# Patient Record
Sex: MALE | Race: WHITE | NOT HISPANIC OR LATINO | Employment: OTHER | URBAN - METROPOLITAN AREA
[De-identification: names, ages, dates, MRNs, and addresses within clinical notes are randomized per-mention and may not be internally consistent; named-entity substitution may affect disease eponyms.]

---

## 2018-01-05 ENCOUNTER — TRANSCRIBE ORDERS (OUTPATIENT)
Dept: LAB | Facility: CLINIC | Age: 73
End: 2018-01-05

## 2018-01-05 ENCOUNTER — APPOINTMENT (OUTPATIENT)
Dept: LAB | Facility: CLINIC | Age: 73
End: 2018-01-05
Payer: COMMERCIAL

## 2018-01-05 DIAGNOSIS — E03.9 MYXEDEMA HEART DISEASE: ICD-10-CM

## 2018-01-05 DIAGNOSIS — I51.9 MYXEDEMA HEART DISEASE: Primary | ICD-10-CM

## 2018-01-05 DIAGNOSIS — E03.9 MYXEDEMA HEART DISEASE: Primary | ICD-10-CM

## 2018-01-05 DIAGNOSIS — I51.9 MYXEDEMA HEART DISEASE: ICD-10-CM

## 2018-01-05 LAB
ALBUMIN SERPL BCP-MCNC: 3.9 G/DL (ref 3.5–5)
ALP SERPL-CCNC: 67 U/L (ref 46–116)
ALT SERPL W P-5'-P-CCNC: 28 U/L (ref 12–78)
ANION GAP SERPL CALCULATED.3IONS-SCNC: 5 MMOL/L (ref 4–13)
AST SERPL W P-5'-P-CCNC: 13 U/L (ref 5–45)
BASOPHILS # BLD AUTO: 0.03 THOUSANDS/ΜL (ref 0–0.1)
BASOPHILS NFR BLD AUTO: 1 % (ref 0–1)
BILIRUB SERPL-MCNC: 0.38 MG/DL (ref 0.2–1)
BILIRUB UR QL STRIP: NEGATIVE
BUN SERPL-MCNC: 19 MG/DL (ref 5–25)
CALCIUM SERPL-MCNC: 8.9 MG/DL (ref 8.3–10.1)
CHLORIDE SERPL-SCNC: 107 MMOL/L (ref 100–108)
CHOLEST SERPL-MCNC: 225 MG/DL (ref 50–200)
CLARITY UR: CLEAR
CO2 SERPL-SCNC: 28 MMOL/L (ref 21–32)
COLOR UR: YELLOW
CREAT SERPL-MCNC: 0.94 MG/DL (ref 0.6–1.3)
EOSINOPHIL # BLD AUTO: 0.17 THOUSAND/ΜL (ref 0–0.61)
EOSINOPHIL NFR BLD AUTO: 3 % (ref 0–6)
ERYTHROCYTE [DISTWIDTH] IN BLOOD BY AUTOMATED COUNT: 15 % (ref 11.6–15.1)
GFR SERPL CREATININE-BSD FRML MDRD: 81 ML/MIN/1.73SQ M
GLUCOSE P FAST SERPL-MCNC: 97 MG/DL (ref 65–99)
GLUCOSE UR STRIP-MCNC: NEGATIVE MG/DL
HCT VFR BLD AUTO: 43.6 % (ref 36.5–49.3)
HDLC SERPL-MCNC: 51 MG/DL (ref 40–60)
HGB BLD-MCNC: 14.3 G/DL (ref 12–17)
HGB UR QL STRIP.AUTO: NEGATIVE
KETONES UR STRIP-MCNC: NEGATIVE MG/DL
LDLC SERPL CALC-MCNC: 159 MG/DL (ref 0–100)
LEUKOCYTE ESTERASE UR QL STRIP: NEGATIVE
LYMPHOCYTES # BLD AUTO: 1.4 THOUSANDS/ΜL (ref 0.6–4.47)
LYMPHOCYTES NFR BLD AUTO: 23 % (ref 14–44)
MCH RBC QN AUTO: 28.9 PG (ref 26.8–34.3)
MCHC RBC AUTO-ENTMCNC: 32.8 G/DL (ref 31.4–37.4)
MCV RBC AUTO: 88 FL (ref 82–98)
MONOCYTES # BLD AUTO: 0.57 THOUSAND/ΜL (ref 0.17–1.22)
MONOCYTES NFR BLD AUTO: 9 % (ref 4–12)
NEUTROPHILS # BLD AUTO: 4.02 THOUSANDS/ΜL (ref 1.85–7.62)
NEUTS SEG NFR BLD AUTO: 64 % (ref 43–75)
NITRITE UR QL STRIP: NEGATIVE
NRBC BLD AUTO-RTO: 0 /100 WBCS
PH UR STRIP.AUTO: 6 [PH] (ref 4.5–8)
PLATELET # BLD AUTO: 309 THOUSANDS/UL (ref 149–390)
PMV BLD AUTO: 10.1 FL (ref 8.9–12.7)
POTASSIUM SERPL-SCNC: 4.9 MMOL/L (ref 3.5–5.3)
PROT SERPL-MCNC: 7.3 G/DL (ref 6.4–8.2)
PROT UR STRIP-MCNC: NEGATIVE MG/DL
RBC # BLD AUTO: 4.94 MILLION/UL (ref 3.88–5.62)
SODIUM SERPL-SCNC: 140 MMOL/L (ref 136–145)
SP GR UR STRIP.AUTO: 1.02 (ref 1–1.03)
TRIGL SERPL-MCNC: 74 MG/DL
TSH SERPL DL<=0.05 MIU/L-ACNC: 2.17 UIU/ML (ref 0.36–3.74)
UROBILINOGEN UR QL STRIP.AUTO: 0.2 E.U./DL
WBC # BLD AUTO: 6.22 THOUSAND/UL (ref 4.31–10.16)

## 2018-01-05 PROCEDURE — 84443 ASSAY THYROID STIM HORMONE: CPT

## 2018-01-05 PROCEDURE — 36415 COLL VENOUS BLD VENIPUNCTURE: CPT

## 2018-01-05 PROCEDURE — 80061 LIPID PANEL: CPT

## 2018-01-05 PROCEDURE — 81003 URINALYSIS AUTO W/O SCOPE: CPT

## 2018-01-05 PROCEDURE — 80053 COMPREHEN METABOLIC PANEL: CPT

## 2018-01-05 PROCEDURE — 85025 COMPLETE CBC W/AUTO DIFF WBC: CPT

## 2018-12-07 ENCOUNTER — APPOINTMENT (OUTPATIENT)
Dept: LAB | Facility: CLINIC | Age: 73
End: 2018-12-07
Payer: COMMERCIAL

## 2018-12-07 ENCOUNTER — TRANSCRIBE ORDERS (OUTPATIENT)
Dept: LAB | Facility: CLINIC | Age: 73
End: 2018-12-07

## 2018-12-07 DIAGNOSIS — E03.9 MYXEDEMA HEART DISEASE: ICD-10-CM

## 2018-12-07 DIAGNOSIS — E03.9 MYXEDEMA HEART DISEASE: Primary | ICD-10-CM

## 2018-12-07 DIAGNOSIS — I51.9 MYXEDEMA HEART DISEASE: ICD-10-CM

## 2018-12-07 DIAGNOSIS — Z12.5 SPECIAL SCREENING FOR MALIGNANT NEOPLASM OF PROSTATE: ICD-10-CM

## 2018-12-07 DIAGNOSIS — E78.5 HYPERLIPIDEMIA, UNSPECIFIED HYPERLIPIDEMIA TYPE: ICD-10-CM

## 2018-12-07 DIAGNOSIS — I51.9 MYXEDEMA HEART DISEASE: Primary | ICD-10-CM

## 2018-12-07 LAB
ALBUMIN SERPL BCP-MCNC: 3.8 G/DL (ref 3.5–5)
ALP SERPL-CCNC: 66 U/L (ref 46–116)
ALT SERPL W P-5'-P-CCNC: 29 U/L (ref 12–78)
ANION GAP SERPL CALCULATED.3IONS-SCNC: 7 MMOL/L (ref 4–13)
AST SERPL W P-5'-P-CCNC: 14 U/L (ref 5–45)
BASOPHILS # BLD AUTO: 0.06 THOUSANDS/ΜL (ref 0–0.1)
BASOPHILS NFR BLD AUTO: 1 % (ref 0–1)
BILIRUB SERPL-MCNC: 0.46 MG/DL (ref 0.2–1)
BILIRUB UR QL STRIP: NEGATIVE
BUN SERPL-MCNC: 21 MG/DL (ref 5–25)
CALCIUM SERPL-MCNC: 8.8 MG/DL (ref 8.3–10.1)
CHLORIDE SERPL-SCNC: 103 MMOL/L (ref 100–108)
CHOLEST SERPL-MCNC: 207 MG/DL (ref 50–200)
CLARITY UR: CLEAR
CO2 SERPL-SCNC: 29 MMOL/L (ref 21–32)
COLOR UR: YELLOW
CREAT SERPL-MCNC: 0.93 MG/DL (ref 0.6–1.3)
EOSINOPHIL # BLD AUTO: 0.16 THOUSAND/ΜL (ref 0–0.61)
EOSINOPHIL NFR BLD AUTO: 3 % (ref 0–6)
ERYTHROCYTE [DISTWIDTH] IN BLOOD BY AUTOMATED COUNT: 13.4 % (ref 11.6–15.1)
GFR SERPL CREATININE-BSD FRML MDRD: 82 ML/MIN/1.73SQ M
GLUCOSE P FAST SERPL-MCNC: 102 MG/DL (ref 65–99)
GLUCOSE UR STRIP-MCNC: NEGATIVE MG/DL
HCT VFR BLD AUTO: 46.8 % (ref 36.5–49.3)
HDLC SERPL-MCNC: 46 MG/DL (ref 40–60)
HGB BLD-MCNC: 14.9 G/DL (ref 12–17)
HGB UR QL STRIP.AUTO: NEGATIVE
IMM GRANULOCYTES # BLD AUTO: 0.04 THOUSAND/UL (ref 0–0.2)
IMM GRANULOCYTES NFR BLD AUTO: 1 % (ref 0–2)
KETONES UR STRIP-MCNC: NEGATIVE MG/DL
LDLC SERPL CALC-MCNC: 144 MG/DL (ref 0–100)
LEUKOCYTE ESTERASE UR QL STRIP: NEGATIVE
LYMPHOCYTES # BLD AUTO: 1.51 THOUSANDS/ΜL (ref 0.6–4.47)
LYMPHOCYTES NFR BLD AUTO: 27 % (ref 14–44)
MCH RBC QN AUTO: 29.8 PG (ref 26.8–34.3)
MCHC RBC AUTO-ENTMCNC: 31.8 G/DL (ref 31.4–37.4)
MCV RBC AUTO: 94 FL (ref 82–98)
MONOCYTES # BLD AUTO: 0.45 THOUSAND/ΜL (ref 0.17–1.22)
MONOCYTES NFR BLD AUTO: 8 % (ref 4–12)
NEUTROPHILS # BLD AUTO: 3.3 THOUSANDS/ΜL (ref 1.85–7.62)
NEUTS SEG NFR BLD AUTO: 60 % (ref 43–75)
NITRITE UR QL STRIP: NEGATIVE
NONHDLC SERPL-MCNC: 161 MG/DL
NRBC BLD AUTO-RTO: 0 /100 WBCS
PH UR STRIP.AUTO: 5.5 [PH] (ref 4.5–8)
PLATELET # BLD AUTO: 286 THOUSANDS/UL (ref 149–390)
PMV BLD AUTO: 9.8 FL (ref 8.9–12.7)
POTASSIUM SERPL-SCNC: 4.2 MMOL/L (ref 3.5–5.3)
PROT SERPL-MCNC: 7.4 G/DL (ref 6.4–8.2)
PROT UR STRIP-MCNC: NEGATIVE MG/DL
PSA SERPL-MCNC: 1.1 NG/ML (ref 0–4)
RBC # BLD AUTO: 5 MILLION/UL (ref 3.88–5.62)
SODIUM SERPL-SCNC: 139 MMOL/L (ref 136–145)
SP GR UR STRIP.AUTO: 1.02 (ref 1–1.03)
T4 FREE SERPL-MCNC: 1.16 NG/DL (ref 0.76–1.46)
TRIGL SERPL-MCNC: 87 MG/DL
TSH SERPL DL<=0.05 MIU/L-ACNC: 0.96 UIU/ML (ref 0.36–3.74)
UROBILINOGEN UR QL STRIP.AUTO: 0.2 E.U./DL
WBC # BLD AUTO: 5.52 THOUSAND/UL (ref 4.31–10.16)

## 2018-12-07 PROCEDURE — 80061 LIPID PANEL: CPT

## 2018-12-07 PROCEDURE — 80053 COMPREHEN METABOLIC PANEL: CPT

## 2018-12-07 PROCEDURE — 84443 ASSAY THYROID STIM HORMONE: CPT

## 2018-12-07 PROCEDURE — 36415 COLL VENOUS BLD VENIPUNCTURE: CPT

## 2018-12-07 PROCEDURE — 81003 URINALYSIS AUTO W/O SCOPE: CPT

## 2018-12-07 PROCEDURE — G0103 PSA SCREENING: HCPCS

## 2018-12-07 PROCEDURE — 84439 ASSAY OF FREE THYROXINE: CPT

## 2018-12-07 PROCEDURE — 85025 COMPLETE CBC W/AUTO DIFF WBC: CPT

## 2019-09-04 ENCOUNTER — TRANSCRIBE ORDERS (OUTPATIENT)
Dept: LAB | Facility: CLINIC | Age: 74
End: 2019-09-04

## 2019-09-04 ENCOUNTER — APPOINTMENT (OUTPATIENT)
Dept: LAB | Facility: CLINIC | Age: 74
End: 2019-09-04
Payer: COMMERCIAL

## 2019-09-04 DIAGNOSIS — I51.9 MYXEDEMA HEART DISEASE: Primary | ICD-10-CM

## 2019-09-04 DIAGNOSIS — E03.9 MYXEDEMA HEART DISEASE: Primary | ICD-10-CM

## 2019-09-04 DIAGNOSIS — D51.9 ANEMIA DUE TO VITAMIN B12 DEFICIENCY, UNSPECIFIED B12 DEFICIENCY TYPE: ICD-10-CM

## 2019-09-04 DIAGNOSIS — E78.5 HYPERLIPIDEMIA, UNSPECIFIED HYPERLIPIDEMIA TYPE: ICD-10-CM

## 2019-09-04 DIAGNOSIS — R73.01 IMPAIRED FASTING GLUCOSE: Primary | ICD-10-CM

## 2019-09-04 DIAGNOSIS — E55.9 AVITAMINOSIS D: ICD-10-CM

## 2019-09-04 DIAGNOSIS — E03.9 MYXEDEMA HEART DISEASE: ICD-10-CM

## 2019-09-04 DIAGNOSIS — I51.9 MYXEDEMA HEART DISEASE: ICD-10-CM

## 2019-09-04 LAB
25(OH)D3 SERPL-MCNC: 29.9 NG/ML (ref 30–100)
ALBUMIN SERPL BCP-MCNC: 3.9 G/DL (ref 3.5–5)
ALP SERPL-CCNC: 65 U/L (ref 46–116)
ALT SERPL W P-5'-P-CCNC: 38 U/L (ref 12–78)
ANION GAP SERPL CALCULATED.3IONS-SCNC: 7 MMOL/L (ref 4–13)
AST SERPL W P-5'-P-CCNC: 19 U/L (ref 5–45)
BACTERIA UR QL AUTO: NORMAL /HPF
BASOPHILS # BLD AUTO: 0.04 THOUSANDS/ΜL (ref 0–0.1)
BASOPHILS NFR BLD AUTO: 1 % (ref 0–1)
BILIRUB SERPL-MCNC: 0.42 MG/DL (ref 0.2–1)
BILIRUB UR QL STRIP: NEGATIVE
BUN SERPL-MCNC: 24 MG/DL (ref 5–25)
CALCIUM SERPL-MCNC: 8.9 MG/DL (ref 8.3–10.1)
CHLORIDE SERPL-SCNC: 105 MMOL/L (ref 100–108)
CHOLEST SERPL-MCNC: 188 MG/DL (ref 50–200)
CLARITY UR: CLEAR
CO2 SERPL-SCNC: 25 MMOL/L (ref 21–32)
COLOR UR: YELLOW
CREAT SERPL-MCNC: 0.96 MG/DL (ref 0.6–1.3)
EOSINOPHIL # BLD AUTO: 0.25 THOUSAND/ΜL (ref 0–0.61)
EOSINOPHIL NFR BLD AUTO: 4 % (ref 0–6)
ERYTHROCYTE [DISTWIDTH] IN BLOOD BY AUTOMATED COUNT: 13.5 % (ref 11.6–15.1)
EST. AVERAGE GLUCOSE BLD GHB EST-MCNC: 105 MG/DL
GFR SERPL CREATININE-BSD FRML MDRD: 78 ML/MIN/1.73SQ M
GLUCOSE P FAST SERPL-MCNC: 106 MG/DL (ref 65–99)
GLUCOSE UR STRIP-MCNC: NEGATIVE MG/DL
HBA1C MFR BLD: 5.3 % (ref 4.2–6.3)
HCT VFR BLD AUTO: 45.8 % (ref 36.5–49.3)
HDLC SERPL-MCNC: 42 MG/DL (ref 40–60)
HGB BLD-MCNC: 14.8 G/DL (ref 12–17)
HGB UR QL STRIP.AUTO: NEGATIVE
HYALINE CASTS #/AREA URNS LPF: NORMAL /LPF
IMM GRANULOCYTES # BLD AUTO: 0.04 THOUSAND/UL (ref 0–0.2)
IMM GRANULOCYTES NFR BLD AUTO: 1 % (ref 0–2)
KETONES UR STRIP-MCNC: NEGATIVE MG/DL
LDLC SERPL CALC-MCNC: 123 MG/DL (ref 0–100)
LEUKOCYTE ESTERASE UR QL STRIP: NEGATIVE
LYMPHOCYTES # BLD AUTO: 1.89 THOUSANDS/ΜL (ref 0.6–4.47)
LYMPHOCYTES NFR BLD AUTO: 31 % (ref 14–44)
MCH RBC QN AUTO: 30.4 PG (ref 26.8–34.3)
MCHC RBC AUTO-ENTMCNC: 32.3 G/DL (ref 31.4–37.4)
MCV RBC AUTO: 94 FL (ref 82–98)
MONOCYTES # BLD AUTO: 0.48 THOUSAND/ΜL (ref 0.17–1.22)
MONOCYTES NFR BLD AUTO: 8 % (ref 4–12)
NEUTROPHILS # BLD AUTO: 3.48 THOUSANDS/ΜL (ref 1.85–7.62)
NEUTS SEG NFR BLD AUTO: 55 % (ref 43–75)
NITRITE UR QL STRIP: NEGATIVE
NON-SQ EPI CELLS URNS QL MICRO: NORMAL /HPF
NONHDLC SERPL-MCNC: 146 MG/DL
NRBC BLD AUTO-RTO: 0 /100 WBCS
PH UR STRIP.AUTO: 6 [PH]
PLATELET # BLD AUTO: 274 THOUSANDS/UL (ref 149–390)
PMV BLD AUTO: 10.6 FL (ref 8.9–12.7)
POTASSIUM SERPL-SCNC: 4.5 MMOL/L (ref 3.5–5.3)
PROT SERPL-MCNC: 7.1 G/DL (ref 6.4–8.2)
PROT UR STRIP-MCNC: NEGATIVE MG/DL
RBC # BLD AUTO: 4.87 MILLION/UL (ref 3.88–5.62)
RBC #/AREA URNS AUTO: NORMAL /HPF
SODIUM SERPL-SCNC: 137 MMOL/L (ref 136–145)
SP GR UR STRIP.AUTO: 1.02 (ref 1–1.03)
TRIGL SERPL-MCNC: 114 MG/DL
TSH SERPL DL<=0.05 MIU/L-ACNC: 0.24 UIU/ML (ref 0.36–3.74)
UROBILINOGEN UR QL STRIP.AUTO: 0.2 E.U./DL
VIT B12 SERPL-MCNC: 338 PG/ML (ref 100–900)
WBC # BLD AUTO: 6.18 THOUSAND/UL (ref 4.31–10.16)
WBC #/AREA URNS AUTO: NORMAL /HPF

## 2019-09-04 PROCEDURE — 81001 URINALYSIS AUTO W/SCOPE: CPT

## 2019-09-04 PROCEDURE — 84443 ASSAY THYROID STIM HORMONE: CPT

## 2019-09-04 PROCEDURE — 85025 COMPLETE CBC W/AUTO DIFF WBC: CPT

## 2019-09-04 PROCEDURE — 82306 VITAMIN D 25 HYDROXY: CPT

## 2019-09-04 PROCEDURE — 80053 COMPREHEN METABOLIC PANEL: CPT

## 2019-09-04 PROCEDURE — 82607 VITAMIN B-12: CPT

## 2019-09-04 PROCEDURE — 83036 HEMOGLOBIN GLYCOSYLATED A1C: CPT

## 2019-09-04 PROCEDURE — 36415 COLL VENOUS BLD VENIPUNCTURE: CPT

## 2019-09-04 PROCEDURE — 80061 LIPID PANEL: CPT

## 2020-09-09 DIAGNOSIS — E03.9 ACQUIRED HYPOTHYROIDISM: Primary | ICD-10-CM

## 2020-09-09 RX ORDER — LEVOTHYROXINE SODIUM 0.12 MG/1
TABLET ORAL
Qty: 90 TABLET | Refills: 0 | Status: SHIPPED | OUTPATIENT
Start: 2020-09-09 | End: 2020-12-28

## 2020-11-06 ENCOUNTER — TELEPHONE (OUTPATIENT)
Dept: INTERNAL MEDICINE CLINIC | Facility: CLINIC | Age: 75
End: 2020-11-06

## 2020-11-10 ENCOUNTER — TELEMEDICINE (OUTPATIENT)
Dept: INTERNAL MEDICINE CLINIC | Facility: CLINIC | Age: 75
End: 2020-11-10
Payer: COMMERCIAL

## 2020-11-10 DIAGNOSIS — E03.9 ACQUIRED HYPOTHYROIDISM: ICD-10-CM

## 2020-11-10 DIAGNOSIS — I10 ESSENTIAL HYPERTENSION: Primary | ICD-10-CM

## 2020-11-10 PROCEDURE — 99213 OFFICE O/P EST LOW 20 MIN: CPT | Performed by: INTERNAL MEDICINE

## 2020-11-25 ENCOUNTER — TELEPHONE (OUTPATIENT)
Dept: INTERNAL MEDICINE CLINIC | Facility: CLINIC | Age: 75
End: 2020-11-25

## 2020-12-02 ENCOUNTER — OFFICE VISIT (OUTPATIENT)
Dept: INTERNAL MEDICINE CLINIC | Facility: CLINIC | Age: 75
End: 2020-12-02
Payer: COMMERCIAL

## 2020-12-02 VITALS
BODY MASS INDEX: 38.06 KG/M2 | HEART RATE: 58 BPM | WEIGHT: 257 LBS | HEIGHT: 69 IN | SYSTOLIC BLOOD PRESSURE: 138 MMHG | TEMPERATURE: 97.9 F | DIASTOLIC BLOOD PRESSURE: 85 MMHG

## 2020-12-02 DIAGNOSIS — Z95.0 PACEMAKER: ICD-10-CM

## 2020-12-02 DIAGNOSIS — E03.9 ACQUIRED HYPOTHYROIDISM: ICD-10-CM

## 2020-12-02 DIAGNOSIS — I49.5 SICK SINUS SYNDROME (HCC): ICD-10-CM

## 2020-12-02 DIAGNOSIS — E66.09 CLASS 2 OBESITY DUE TO EXCESS CALORIES WITHOUT SERIOUS COMORBIDITY WITH BODY MASS INDEX (BMI) OF 37.0 TO 37.9 IN ADULT: ICD-10-CM

## 2020-12-02 DIAGNOSIS — Z00.00 MEDICARE ANNUAL WELLNESS VISIT, INITIAL: Primary | ICD-10-CM

## 2020-12-02 DIAGNOSIS — E78.2 MIXED HYPERLIPIDEMIA: ICD-10-CM

## 2020-12-02 PROBLEM — G45.4 TRANSIENT GLOBAL AMNESIA: Status: ACTIVE | Noted: 2019-11-19

## 2020-12-02 PROBLEM — E78.5 HYPERLIPIDEMIA: Status: ACTIVE | Noted: 2020-12-02

## 2020-12-02 PROCEDURE — 1170F FXNL STATUS ASSESSED: CPT | Performed by: INTERNAL MEDICINE

## 2020-12-02 PROCEDURE — 1125F AMNT PAIN NOTED PAIN PRSNT: CPT | Performed by: INTERNAL MEDICINE

## 2020-12-02 PROCEDURE — 3008F BODY MASS INDEX DOCD: CPT | Performed by: INTERNAL MEDICINE

## 2020-12-02 PROCEDURE — 99214 OFFICE O/P EST MOD 30 MIN: CPT | Performed by: INTERNAL MEDICINE

## 2020-12-02 PROCEDURE — 3075F SYST BP GE 130 - 139MM HG: CPT | Performed by: INTERNAL MEDICINE

## 2020-12-02 PROCEDURE — 3079F DIAST BP 80-89 MM HG: CPT | Performed by: INTERNAL MEDICINE

## 2020-12-02 PROCEDURE — G0439 PPPS, SUBSEQ VISIT: HCPCS | Performed by: INTERNAL MEDICINE

## 2020-12-02 PROCEDURE — 1160F RVW MEDS BY RX/DR IN RCRD: CPT | Performed by: INTERNAL MEDICINE

## 2020-12-02 RX ORDER — LANOLIN ALCOHOL/MO/W.PET/CERES
1 CREAM (GRAM) TOPICAL 2 TIMES DAILY
COMMUNITY
End: 2021-06-20

## 2020-12-02 RX ORDER — LEVOTHYROXINE SODIUM 0.12 MG/1
150 TABLET ORAL DAILY
COMMUNITY
End: 2020-12-02

## 2020-12-02 RX ORDER — A/SINGAPORE/GP1908/2015 IVR-180 (AN A/MICHIGAN/45/2015 (H1N1)PDM09-LIKE VIRUS, A/HONG KONG/4801/2014, NYMC X-263B (H3N2) (AN A/HONG KONG/4801/2014-LIKE VIRUS), AND B/BRISBANE/60/2008, WILD TYPE (A B/BRISBANE/60/2008-LIKE VIRUS) 15; 15; 15 UG/.5ML; UG/.5ML; UG/.5ML
INJECTION, SUSPENSION INTRAMUSCULAR
COMMUNITY
Start: 2020-10-08 | End: 2021-06-20

## 2020-12-02 RX ORDER — AMPICILLIN TRIHYDRATE 250 MG
600 CAPSULE ORAL 2 TIMES DAILY
COMMUNITY

## 2020-12-26 DIAGNOSIS — E03.9 ACQUIRED HYPOTHYROIDISM: ICD-10-CM

## 2020-12-28 RX ORDER — LEVOTHYROXINE SODIUM 0.12 MG/1
TABLET ORAL
Qty: 90 TABLET | Refills: 0 | Status: SHIPPED | OUTPATIENT
Start: 2020-12-28 | End: 2020-12-30 | Stop reason: SDUPTHER

## 2020-12-30 ENCOUNTER — TELEPHONE (OUTPATIENT)
Dept: INTERNAL MEDICINE CLINIC | Facility: CLINIC | Age: 75
End: 2020-12-30

## 2020-12-30 DIAGNOSIS — E03.9 ACQUIRED HYPOTHYROIDISM: ICD-10-CM

## 2020-12-30 RX ORDER — LEVOTHYROXINE SODIUM 0.12 MG/1
125 TABLET ORAL DAILY
Qty: 90 TABLET | Refills: 0 | Status: SHIPPED | OUTPATIENT
Start: 2020-12-30 | End: 2021-01-13 | Stop reason: SDUPTHER

## 2021-01-05 ENCOUNTER — LAB (OUTPATIENT)
Dept: LAB | Facility: CLINIC | Age: 76
End: 2021-01-05
Payer: COMMERCIAL

## 2021-01-05 DIAGNOSIS — E03.9 ACQUIRED HYPOTHYROIDISM: ICD-10-CM

## 2021-01-05 LAB
ALBUMIN SERPL BCP-MCNC: 3.6 G/DL (ref 3.5–5)
ALP SERPL-CCNC: 65 U/L (ref 46–116)
ALT SERPL W P-5'-P-CCNC: 26 U/L (ref 12–78)
ANION GAP SERPL CALCULATED.3IONS-SCNC: 1 MMOL/L (ref 4–13)
AST SERPL W P-5'-P-CCNC: 7 U/L (ref 5–45)
BILIRUB SERPL-MCNC: 0.46 MG/DL (ref 0.2–1)
BUN SERPL-MCNC: 22 MG/DL (ref 5–25)
CALCIUM SERPL-MCNC: 8.9 MG/DL (ref 8.3–10.1)
CHLORIDE SERPL-SCNC: 106 MMOL/L (ref 100–108)
CO2 SERPL-SCNC: 30 MMOL/L (ref 21–32)
CREAT SERPL-MCNC: 0.93 MG/DL (ref 0.6–1.3)
GFR SERPL CREATININE-BSD FRML MDRD: 81 ML/MIN/1.73SQ M
GLUCOSE P FAST SERPL-MCNC: 96 MG/DL (ref 65–99)
POTASSIUM SERPL-SCNC: 4.8 MMOL/L (ref 3.5–5.3)
PROT SERPL-MCNC: 6.9 G/DL (ref 6.4–8.2)
SODIUM SERPL-SCNC: 137 MMOL/L (ref 136–145)
T4 FREE SERPL-MCNC: 0.9 NG/DL (ref 0.76–1.46)
TSH SERPL DL<=0.05 MIU/L-ACNC: 2.76 UIU/ML (ref 0.36–3.74)

## 2021-01-05 PROCEDURE — 36415 COLL VENOUS BLD VENIPUNCTURE: CPT

## 2021-01-05 PROCEDURE — 84439 ASSAY OF FREE THYROXINE: CPT

## 2021-01-05 PROCEDURE — 80053 COMPREHEN METABOLIC PANEL: CPT

## 2021-01-05 PROCEDURE — 84443 ASSAY THYROID STIM HORMONE: CPT

## 2021-01-13 DIAGNOSIS — E03.9 ACQUIRED HYPOTHYROIDISM: ICD-10-CM

## 2021-01-13 RX ORDER — LEVOTHYROXINE SODIUM 0.12 MG/1
125 TABLET ORAL DAILY
Qty: 90 TABLET | Refills: 0 | Status: SHIPPED | OUTPATIENT
Start: 2021-01-13 | End: 2021-04-14 | Stop reason: SDUPTHER

## 2021-01-13 RX ORDER — LEVOTHYROXINE SODIUM 0.12 MG/1
125 TABLET ORAL DAILY
Qty: 90 TABLET | Refills: 1 | Status: CANCELLED | OUTPATIENT
Start: 2021-01-13

## 2021-01-13 NOTE — TELEPHONE ENCOUNTER
I already spoke to the pt  The automated system requested the initial refill but th ept has not been in [id number] [facility] since October  He does not have enough medication and needs it sent to the correct pharmacy  I requested to disregard this task because Soraida Ni also sent you a refill for this earlier

## 2021-01-20 DIAGNOSIS — Z23 ENCOUNTER FOR IMMUNIZATION: ICD-10-CM

## 2021-01-22 ENCOUNTER — IMMUNIZATIONS (OUTPATIENT)
Dept: FAMILY MEDICINE CLINIC | Facility: HOSPITAL | Age: 76
End: 2021-01-22

## 2021-01-22 DIAGNOSIS — Z23 ENCOUNTER FOR IMMUNIZATION: Primary | ICD-10-CM

## 2021-01-22 PROCEDURE — 0011A SARS-COV-2 / COVID-19 MRNA VACCINE (MODERNA) 100 MCG: CPT

## 2021-01-22 PROCEDURE — 91301 SARS-COV-2 / COVID-19 MRNA VACCINE (MODERNA) 100 MCG: CPT

## 2021-02-17 ENCOUNTER — IMMUNIZATIONS (OUTPATIENT)
Dept: FAMILY MEDICINE CLINIC | Facility: HOSPITAL | Age: 76
End: 2021-02-17

## 2021-02-17 DIAGNOSIS — Z23 ENCOUNTER FOR IMMUNIZATION: Primary | ICD-10-CM

## 2021-02-17 PROCEDURE — 91301 SARS-COV-2 / COVID-19 MRNA VACCINE (MODERNA) 100 MCG: CPT | Performed by: EMERGENCY MEDICINE

## 2021-02-17 PROCEDURE — 0012A SARS-COV-2 / COVID-19 MRNA VACCINE (MODERNA) 100 MCG: CPT | Performed by: EMERGENCY MEDICINE

## 2021-03-10 ENCOUNTER — OFFICE VISIT (OUTPATIENT)
Dept: INTERNAL MEDICINE CLINIC | Facility: CLINIC | Age: 76
End: 2021-03-10
Payer: COMMERCIAL

## 2021-03-10 ENCOUNTER — APPOINTMENT (OUTPATIENT)
Dept: LAB | Facility: HOSPITAL | Age: 76
End: 2021-03-10
Attending: INTERNAL MEDICINE
Payer: COMMERCIAL

## 2021-03-10 VITALS
HEART RATE: 58 BPM | TEMPERATURE: 99.1 F | DIASTOLIC BLOOD PRESSURE: 90 MMHG | SYSTOLIC BLOOD PRESSURE: 160 MMHG | BODY MASS INDEX: 38.95 KG/M2 | HEIGHT: 69 IN | WEIGHT: 263 LBS

## 2021-03-10 DIAGNOSIS — I50.41 ACUTE COMBINED SYSTOLIC AND DIASTOLIC CONGESTIVE HEART FAILURE (HCC): Primary | ICD-10-CM

## 2021-03-10 DIAGNOSIS — E03.9 ACQUIRED HYPOTHYROIDISM: ICD-10-CM

## 2021-03-10 DIAGNOSIS — I50.41 ACUTE COMBINED SYSTOLIC AND DIASTOLIC CONGESTIVE HEART FAILURE (HCC): ICD-10-CM

## 2021-03-10 DIAGNOSIS — E78.2 MIXED HYPERLIPIDEMIA: ICD-10-CM

## 2021-03-10 DIAGNOSIS — Z12.11 ENCOUNTER FOR SCREENING FOR MALIGNANT NEOPLASM OF COLON: ICD-10-CM

## 2021-03-10 DIAGNOSIS — Z95.0 PACEMAKER: ICD-10-CM

## 2021-03-10 DIAGNOSIS — E66.9 OBESITY (BMI 30-39.9): ICD-10-CM

## 2021-03-10 LAB
ALBUMIN SERPL BCP-MCNC: 3.8 G/DL (ref 3.5–5)
ALP SERPL-CCNC: 66 U/L (ref 46–116)
ALT SERPL W P-5'-P-CCNC: 34 U/L (ref 12–78)
ANION GAP SERPL CALCULATED.3IONS-SCNC: 3 MMOL/L (ref 4–13)
AST SERPL W P-5'-P-CCNC: 11 U/L (ref 5–45)
BASOPHILS # BLD AUTO: 0.05 THOUSANDS/ΜL (ref 0–0.1)
BASOPHILS NFR BLD AUTO: 1 % (ref 0–1)
BILIRUB SERPL-MCNC: 0.38 MG/DL (ref 0.2–1)
BUN SERPL-MCNC: 21 MG/DL (ref 5–25)
CALCIUM SERPL-MCNC: 9.1 MG/DL (ref 8.3–10.1)
CHLORIDE SERPL-SCNC: 111 MMOL/L (ref 100–108)
CO2 SERPL-SCNC: 27 MMOL/L (ref 21–32)
CREAT SERPL-MCNC: 1.03 MG/DL (ref 0.6–1.3)
EOSINOPHIL # BLD AUTO: 0.2 THOUSAND/ΜL (ref 0–0.61)
EOSINOPHIL NFR BLD AUTO: 3 % (ref 0–6)
ERYTHROCYTE [DISTWIDTH] IN BLOOD BY AUTOMATED COUNT: 13.8 % (ref 11.6–15.1)
GFR SERPL CREATININE-BSD FRML MDRD: 71 ML/MIN/1.73SQ M
GLUCOSE SERPL-MCNC: 95 MG/DL (ref 65–140)
HCT VFR BLD AUTO: 43.1 % (ref 36.5–49.3)
HGB BLD-MCNC: 13.9 G/DL (ref 12–17)
IMM GRANULOCYTES # BLD AUTO: 0.04 THOUSAND/UL (ref 0–0.2)
IMM GRANULOCYTES NFR BLD AUTO: 1 % (ref 0–2)
LYMPHOCYTES # BLD AUTO: 1.82 THOUSANDS/ΜL (ref 0.6–4.47)
LYMPHOCYTES NFR BLD AUTO: 27 % (ref 14–44)
MCH RBC QN AUTO: 29.8 PG (ref 26.8–34.3)
MCHC RBC AUTO-ENTMCNC: 32.3 G/DL (ref 31.4–37.4)
MCV RBC AUTO: 92 FL (ref 82–98)
MONOCYTES # BLD AUTO: 0.49 THOUSAND/ΜL (ref 0.17–1.22)
MONOCYTES NFR BLD AUTO: 7 % (ref 4–12)
NEUTROPHILS # BLD AUTO: 4.28 THOUSANDS/ΜL (ref 1.85–7.62)
NEUTS SEG NFR BLD AUTO: 61 % (ref 43–75)
NRBC BLD AUTO-RTO: 0 /100 WBCS
NT-PROBNP SERPL-MCNC: 207 PG/ML
PLATELET # BLD AUTO: 334 THOUSANDS/UL (ref 149–390)
PMV BLD AUTO: 9.8 FL (ref 8.9–12.7)
POTASSIUM SERPL-SCNC: 4.4 MMOL/L (ref 3.5–5.3)
PROT SERPL-MCNC: 7 G/DL (ref 6.4–8.2)
RBC # BLD AUTO: 4.67 MILLION/UL (ref 3.88–5.62)
SODIUM SERPL-SCNC: 141 MMOL/L (ref 136–145)
TROPONIN I SERPL-MCNC: <0.02 NG/ML
WBC # BLD AUTO: 6.88 THOUSAND/UL (ref 4.31–10.16)

## 2021-03-10 PROCEDURE — 83880 ASSAY OF NATRIURETIC PEPTIDE: CPT

## 2021-03-10 PROCEDURE — 84484 ASSAY OF TROPONIN QUANT: CPT

## 2021-03-10 PROCEDURE — 80053 COMPREHEN METABOLIC PANEL: CPT

## 2021-03-10 PROCEDURE — 3008F BODY MASS INDEX DOCD: CPT | Performed by: INTERNAL MEDICINE

## 2021-03-10 PROCEDURE — 1036F TOBACCO NON-USER: CPT | Performed by: INTERNAL MEDICINE

## 2021-03-10 PROCEDURE — 99214 OFFICE O/P EST MOD 30 MIN: CPT | Performed by: INTERNAL MEDICINE

## 2021-03-10 PROCEDURE — 36415 COLL VENOUS BLD VENIPUNCTURE: CPT

## 2021-03-10 PROCEDURE — 85025 COMPLETE CBC W/AUTO DIFF WBC: CPT

## 2021-03-10 RX ORDER — FUROSEMIDE 20 MG/1
20 TABLET ORAL DAILY
Qty: 30 TABLET | Refills: 3 | Status: SHIPPED | OUTPATIENT
Start: 2021-03-10 | End: 2021-06-20

## 2021-03-10 NOTE — PROGRESS NOTES
Assessment/Plan: This is a 80-year-old gentleman who is supposed to fly out to [place] tomorrow  He complains of right ankle swelling  Denies any chest pain or shortness of breath  He has a history of   Sick sinus syndrome, transient global amnesia pacemaker insertion hypothyroidism and obesity  Labs were ordered to rule out congestive heart failure  Because he will be flying instructions were given to keep ambulating  periodically  1  Acute combined systolic and diastolic congestive heart failure ([facility] [address] )  Comments:   labs were ordered will monitor  Orders:  -     NT-BNP PRO; Future  -     CBC and differential; Future  -     Comprehensive metabolic panel; Future  -     Troponin I; Future  -     Echo complete with contrast if indicated; Future; Expected date: 03/10/2021  -     furosemide (LASIX) 20 mg tablet; Take 1 tablet (20 mg total) by mouth daily    2  Acquired hypothyroidism  Comments:    Continue meds monitor labs  3  Mixed hyperlipidemia    4  Pacemaker    5  Encounter for screening for malignant neoplasm of colon  -     Occult Bloood,Fecal Immunochemical; Future    6  Obesity (BMI 30-39  9)  Comments:    Weight loss was strongly recommended  1  Acquired hypothyroidism      2  Pacemaker      3  Encounter for screening for malignant neoplasm of colon    - Occult Bloood,Fecal Immunochemical; Future           Subjective:      Patient ID: Lesley Zayas is a 77 y o  male  This is a 80-year-old gentleman who  Presents with right leg swelling  Denies any chest pain nocturnal dyspnea  He has gained significant amount of weight lately  The following portions of the patient's history were reviewed and updated as appropriate: He  has a past medical history of Disease of thyroid gland    He   Patient Active Problem List    Diagnosis Date Noted    Obesity (BMI 30-39 9) 03/10/2021    Hyperlipidemia 12/02/2020    Transient global amnesia 11/19/2019    Pacemaker 08/22/2016    Sick sinus syndrome ([facility] [address] ) 08/15/2016    Obesity, unspecified 03/07/2014    Hypothyroidism 12/19/2013     He  has no past surgical history on file  His family history includes COPD in his father; Myasthenia gravis in his mother  He  reports that he has never smoked  He has never used smokeless tobacco  No history on file for alcohol and drug  Current Outpatient Medications   Medication Sig Dispense Refill    glucosamine-chondroitin 500-400 MG tablet Take 1 tablet by mouth 2 (two) times a day      levothyroxine 125 mcg tablet Take 1 tablet (125 mcg total) by mouth daily 90 tablet 0    Red Yeast Rice 600 MG CAPS Take 600 mg by mouth 2 (two) times a day      Fluad Quadrivalent 0 5 ML PRSY       furosemide (LASIX) 20 mg tablet Take 1 tablet (20 mg total) by mouth daily 30 tablet 3     No current facility-administered medications for this visit  Current Outpatient Medications on File Prior to Visit   Medication Sig    glucosamine-chondroitin 500-400 MG tablet Take 1 tablet by mouth 2 (two) times a day    levothyroxine 125 mcg tablet Take 1 tablet (125 mcg total) by mouth daily    Red Yeast Rice 600 MG CAPS Take 600 mg by mouth 2 (two) times a day    Fluad Quadrivalent 0 5 ML PRSY      No current facility-administered medications on file prior to visit  He is allergic to iodine       Review of Systems   Constitutional: Negative for appetite change, chills, fatigue and fever  HENT: Negative for sore throat and trouble swallowing  Eyes: Negative for redness  Respiratory: Negative for shortness of breath  Cardiovascular: Negative for chest pain and palpitations  Gastrointestinal: Negative for abdominal pain, constipation and diarrhea  Genitourinary: Negative for dysuria and hematuria  Musculoskeletal: Negative for back pain and neck pain  Skin: Negative for rash  Neurological: Negative for seizures, weakness and headaches  Hematological: Negative for adenopathy  Psychiatric/Behavioral: Negative for confusion  The patient is not nervous/anxious            Objective:      /90   Pulse 58   Temp 99 1 °F (37 3 °C)   Ht 5' 9" (1 753 m)   Wt 119 kg (263 lb)   BMI 38 84 kg/m²     Recent Results (from the past 1344 hour(s))   NT-BNP PRO    Collection Time: 03/10/21 11:58 AM   Result Value Ref Range    NT-proBNP 207 <450 pg/mL   CBC and differential    Collection Time: 03/10/21 11:58 AM   Result Value Ref Range    WBC 6 88 4 31 - 10 16 Thousand/uL    RBC 4 67 3 88 - 5 62 Million/uL    Hemoglobin 13 9 12 0 - 17 0 g/dL    Hematocrit 43 1 36 5 - 49 3 %    MCV 92 82 - 98 fL    MCH 29 8 26 8 - 34 3 pg    MCHC 32 3 31 4 - 37 4 g/dL    RDW 13 8 11 6 - 15 1 %    MPV 9 8 8 9 - 12 7 fL    Platelets 007 103 - 379 Thousands/uL    nRBC 0 /100 WBCs    Neutrophils Relative 61 43 - 75 %    Immat GRANS % 1 0 - 2 %    Lymphocytes Relative 27 14 - 44 %    Monocytes Relative 7 4 - 12 %    Eosinophils Relative 3 0 - 6 %    Basophils Relative 1 0 - 1 %    Neutrophils Absolute 4 28 1 85 - 7 62 Thousands/µL    Immature Grans Absolute 0 04 0 00 - 0 20 Thousand/uL    Lymphocytes Absolute 1 82 0 60 - 4 47 Thousands/µL    Monocytes Absolute 0 49 0 17 - 1 22 Thousand/µL    Eosinophils Absolute 0 20 0 00 - 0 61 Thousand/µL    Basophils Absolute 0 05 0 00 - 0 10 Thousands/µL   Comprehensive metabolic panel    Collection Time: 03/10/21 11:58 AM   Result Value Ref Range    Sodium 141 136 - 145 mmol/L    Potassium 4 4 3 5 - 5 3 mmol/L    Chloride 111 (H) 100 - 108 mmol/L    CO2 27 21 - 32 mmol/L    ANION GAP 3 (L) 4 - 13 mmol/L    BUN 21 5 - 25 mg/dL    Creatinine 1 03 0 60 - 1 30 mg/dL    Glucose 95 65 - 140 mg/dL    Calcium 9 1 8 3 - 10 1 mg/dL    AST 11 5 - 45 U/L    ALT 34 12 - 78 U/L    Alkaline Phosphatase 66 46 - 116 U/L    Total Protein 7 0 6 4 - 8 2 g/dL    Albumin 3 8 3 5 - 5 0 g/dL    Total Bilirubin 0 38 0 20 - 1 00 mg/dL    eGFR 71 ml/min/1 73sq m   Troponin I    Collection Time: 03/10/21 11:58 AM   Result Value Ref Range    Troponin I <0 02 <=0 04 ng/mL        Physical Exam  Constitutional:       General: He is not in acute distress  Appearance: Normal appearance  HENT:      Head: Normocephalic and atraumatic  Nose: Nose normal       Mouth/Throat:      Mouth: Mucous membranes are moist    Eyes:      Extraocular Movements: Extraocular movements intact  Pupils: Pupils are equal, round, and reactive to light  Neck:      Musculoskeletal: Normal range of motion and neck supple  Cardiovascular:      Rate and Rhythm: Normal rate and regular rhythm  Pulses: Normal pulses  Heart sounds: Normal heart sounds  No murmur  No friction rub  Pulmonary:      Effort: Pulmonary effort is normal  No respiratory distress  Breath sounds: Normal breath sounds  No wheezing  Abdominal:      General: Abdomen is flat  Bowel sounds are normal  There is no distension  Palpations: Abdomen is soft  There is no mass  Tenderness: There is no abdominal tenderness  There is no guarding  Musculoskeletal: Normal range of motion  Right lower leg: Edema present  Comments: No calf tenderness or erythema is appreciated   Neurological:      General: No focal deficit present  Mental Status: He is alert and oriented to person, place, and time  Mental status is at baseline  Cranial Nerves: No cranial nerve deficit  Psychiatric:         Mood and Affect: Mood normal          Behavior: Behavior normal      EKG: unchanged from previous tracings, normal sinus rhythm

## 2021-03-10 NOTE — PATIENT INSTRUCTIONS
Heart Failure   AMBULATORY CARE:   Heart failure (HF) is a condition that does not allow your heart to fill or pump properly  Not enough oxygen in your blood gets to your organs and tissues  Fluid may not move through your body properly  Fluid builds up and causes swelling and difficulty breathing  This is known as congestive heart failure  HF may start in the left or right ventricle  HF is often caused by damage or injury to your heart  The damage may be caused by other heart problems, diabetes, or high blood pressure  The damage may have also been caused by an infection  HF is a long-term condition that tends to get worse over time  It is important to manage your health to improve your quality of life  Common signs and symptoms:   · Difficulty breathing with activity that worsens to difficulty breathing at rest    · Shortness of breath while lying flat    · Severe shortness of breath and coughing at night that usually wakes you    · Chest pain at night    · Periods of no breathing, then breathing fast    · Fatigue or lack of energy (often worsened by physical activity)    · Swelling in your ankles, legs, or abdomen    · Fast heartbeat, purple color around your mouth and nails    · Fingers and toes cool to the touch    Call your local emergency number (911 in the [address] [facility]) if:   · You have any of the following signs of a heart attack:      ? Squeezing, pressure, or pain in your chest    ? You may  also have any of the following:     § Discomfort or pain in your back, neck, jaw, stomach, or arm    § Shortness of breath    § Nausea or vomiting    § Lightheadedness or a sudden cold sweat      Call your doctor if:   · Your heartbeat is fast, slow, or uneven all the time  · You have symptoms of worsening HF:      ? Shortness of breath at rest, at night, or that is getting worse in any way    ? Weight gain of 3 or more pounds (1 4 kg) in a day, or more than your healthcare provider says is okay    ?  More swelling in your legs or ankles    ? Abdominal pain or swelling    ? More coughing    ? Loss of appetite    ? Feeling tired all the time    · You feel hopeless or depressed, or you have lost interest in things you used to enjoy  · You often feel worried or afraid  · You have questions or concerns about your condition or care  Treatment for HF  may include any of the following:  · Medicines  may be needed to help regulate your heart rhythm  You may also need medicines to lower your blood pressure, and to decrease extra fluids  · Oxygen  may help you breathe easier if your oxygen level is lower than normal  A CPAP machine may be used to keep your airway open while you sleep  · Cardiac rehab  is a program run by specialists who will help you safely strengthen your heart  In the program you will learn about exercise, relaxation, stress management, and heart-healthy nutrition  Cardiac rehab may be recommended if your HF is not severe  · Surgery  can be done to implant a pacemaker or another device in your chest to regulate your heart rhythm  Other types of surgery can open blocked heart vessels, replace a damaged heart valve, or remove scar tissue  Manage or prevent HF:  Your quality of life may improve with treatment and the following:  · Do not smoke  Nicotine and other chemicals in cigarettes and cigars can cause lung and heart damage  Ask your healthcare provider for information if you currently smoke and need help to quit  E-cigarettes or smokeless tobacco still contain nicotine  Talk to your healthcare provider before you use these products  · Do not drink alcohol or use illegal drugs  Alcohol and drugs can increase your risk for high blood pressure, diabetes, and coronary artery disease  · Eat heart-healthy foods and limit sodium (salt)  Eat more fresh fruits and vegetables  Eat fewer canned and processed foods   Replace butter and margarine with heart-healthy oils such as olive oil and canola oil  Other heart-healthy foods include walnuts, whole-grain breads, low-fat dairy products, beans, and lean meats  Fatty fish such as salmon and tuna are also heart healthy  Ask how much salt you can eat each day  · Manage any chronic health conditions you have  These include high blood pressure, diabetes, obesity, high cholesterol, metabolic syndrome, and COPD  You will have fewer symptoms if you manage these health conditions  Follow your healthcare provider's recommendations and follow up with him or her regularly  · Drink liquids as directed  You may need to limit the amount of liquids you drink if you have fluid buildup  Ask how much liquid to drink each day and which liquids are best for you  · Maintain a healthy weight  Being overweight can increase your risk for high blood pressure, diabetes, and coronary artery disease  These conditions can make your symptoms worse  Ask your healthcare provider how much you should weigh  Ask him or her to help you create a weight loss plan if you are overweight  · Stay active  Activity can help keep your symptoms from getting worse  Walking is a type of physical activity that helps maintain your strength and improve your mood  Physical activity also helps you manage your weight  Work with your healthcare provider to create an exercise plan that is right for you  · Weigh yourself every morning  Use the same scale, in the same spot  Do this after you use the bathroom, but before you eat or drink  Wear the same type of clothing each time  Write down your weight and call your healthcare provider if you have a sudden weight gain  Swelling and weight gain are signs of fluid buildup  · Get vaccines as directed  Get a flu shot every year  You may also need the pneumonia vaccine  The flu and pneumonia can be severe for a person who has HF  Vaccines protect you from these infections  Join a support group:  HF can be difficult to manage   It may be helpful to talk with others who have HF  You may learn how to better manage your condition or get emotional support  For more information:  · [address]  [place] , [place]   Phone: 0- 444 - [phone]  Web Address: https://groSolar     Follow up with your doctor or cardiologist within 7 days or as directed: You may need to return for other tests  You may need home health care  A healthcare provider will monitor your vital signs, weight, and make sure your medicines are working  Write down your questions so you remember to ask them during your visits  © Copyright [address] Information is for End User's use only and may not be sold, redistributed or otherwise used for commercial purposes  All illustrations and images included in CareNotes® are the copyrighted property of A D A M , Inc  or Dejon Varghese   The above information is an  only  It is not intended as medical advice for individual conditions or treatments  Talk to your doctor, nurse or pharmacist before following any medical regimen to see if it is safe and effective for you

## 2021-03-17 ENCOUNTER — APPOINTMENT (OUTPATIENT)
Dept: LAB | Facility: HOSPITAL | Age: 76
End: 2021-03-17
Attending: INTERNAL MEDICINE
Payer: COMMERCIAL

## 2021-03-17 DIAGNOSIS — Z12.11 ENCOUNTER FOR SCREENING FOR MALIGNANT NEOPLASM OF COLON: ICD-10-CM

## 2021-03-17 LAB — HEMOCCULT STL QL IA: NEGATIVE

## 2021-03-17 PROCEDURE — G0328 FECAL BLOOD SCRN IMMUNOASSAY: HCPCS

## 2021-04-14 DIAGNOSIS — E03.9 ACQUIRED HYPOTHYROIDISM: ICD-10-CM

## 2021-04-14 RX ORDER — LEVOTHYROXINE SODIUM 0.12 MG/1
125 TABLET ORAL DAILY
Qty: 90 TABLET | Refills: 0 | Status: SHIPPED | OUTPATIENT
Start: 2021-04-14 | End: 2021-06-14 | Stop reason: SDUPTHER

## 2021-05-07 ENCOUNTER — RA CDI HCC (OUTPATIENT)
Dept: OTHER | Facility: HOSPITAL | Age: 76
End: 2021-05-07

## 2021-05-08 NOTE — PROGRESS NOTES
[facility]  coding opportunities          Chart reviewed, no opportunity found: CHART REVIEWED, NO OPPORTUNITY FOUND welling, nodes

## 2021-05-11 ENCOUNTER — TELEMEDICINE (OUTPATIENT)
Dept: INTERNAL MEDICINE CLINIC | Facility: CLINIC | Age: 76
End: 2021-05-11
Payer: COMMERCIAL

## 2021-05-11 VITALS
HEART RATE: 63 BPM | WEIGHT: 260 LBS | DIASTOLIC BLOOD PRESSURE: 85 MMHG | SYSTOLIC BLOOD PRESSURE: 149 MMHG | BODY MASS INDEX: 38.51 KG/M2 | HEIGHT: 69 IN

## 2021-05-11 DIAGNOSIS — I10 ESSENTIAL HYPERTENSION: Primary | ICD-10-CM

## 2021-05-11 DIAGNOSIS — I49.5 SICK SINUS SYNDROME (HCC): ICD-10-CM

## 2021-05-11 DIAGNOSIS — M17.11 PRIMARY OSTEOARTHRITIS OF RIGHT KNEE: ICD-10-CM

## 2021-05-11 DIAGNOSIS — Z95.0 PACEMAKER: ICD-10-CM

## 2021-05-11 DIAGNOSIS — E66.9 OBESITY (BMI 30-39.9): ICD-10-CM

## 2021-05-11 DIAGNOSIS — E03.9 ACQUIRED HYPOTHYROIDISM: ICD-10-CM

## 2021-05-11 PROCEDURE — 1036F TOBACCO NON-USER: CPT | Performed by: INTERNAL MEDICINE

## 2021-05-11 PROCEDURE — 99214 OFFICE O/P EST MOD 30 MIN: CPT | Performed by: INTERNAL MEDICINE

## 2021-05-11 PROCEDURE — 1160F RVW MEDS BY RX/DR IN RCRD: CPT | Performed by: INTERNAL MEDICINE

## 2021-05-13 NOTE — PROGRESS NOTES
Virtual Regular Visit      Assessment/Plan: This is a 81-year-old gentleman with a history of hypothyroidism sick sinus syndrome and pacemaker  Currently he is in a motor home in [place]  He denies any chest pain or shortness of breath  He does complain of knee pain  His recent blood pressures have been elevated  Problem List Items Addressed This Visit        Endocrine    Hypothyroidism       Cardiovascular and Mediastinum    Sick sinus syndrome ([address] )       Other    Pacemaker    Obesity (BMI 30-39  9)      Other Visit Diagnoses     Essential hypertension    -  Primary    Hypertension is not well controlled  He will check his blood pressures every other day and send me his readings  Primary osteoarthritis of right knee        X-rays were reviewed and he is referred to an orthopedic doctor  Reason for visit is   Chief Complaint   Patient presents with    Virtual Regular Visit    Follow-up     medical issues    health maintenance     DUE FOR BMI F/U    Virtual Regular Visit        Encounter provider Owen Abraham MD    Provider located at [address]  [facility] [address] [phone]      Recent Visits  Date Type Provider Dept   05/11/21 Telemedicine Owen Abraham MD [facility]  74 Internal Med   Showing recent visits within past 7 days and meeting all other requirements     Future Appointments  No visits were found meeting these conditions  Showing future appointments within next 150 days and meeting all other requirements        The patient was identified by name and date of birth  Claire Hawkins was informed that this is a telemedicine visit and that the visit is being conducted through [address] [place] [address] and patient was informed that this is not a secure, HIPAA-compliant platform  He agrees to proceed     My office door was closed  No one else was in the room    He acknowledged consent and understanding of privacy and security of the video platform  The patient has agreed to participate and understands they can discontinue the visit at any time  Patient is aware this is a billable service  Subjective  Charlene Amaya is a 77 y o  male with a history of sick sinus syndrome         This is a 71-year-old gentleman with a history of hypothyroidism sick sinus syndrome and pacemaker  Currently he is in a motor home in [place]  He denies any chest pain or shortness of breath  He does complain of knee pain  His recent blood pressures have been elevated  Past Medical History:   Diagnosis Date    Disease of thyroid gland        History reviewed  No pertinent surgical history  Current Outpatient Medications   Medication Sig Dispense Refill    levothyroxine 125 mcg tablet Take 1 tablet (125 mcg total) by mouth daily 90 tablet 0    Red Yeast Rice 600 MG CAPS Take 600 mg by mouth 2 (two) times a day      Fluad Quadrivalent 0 5 ML PRSY       furosemide (LASIX) 20 mg tablet Take 1 tablet (20 mg total) by mouth daily (Patient not taking: Reported on 5/11/2021) 30 tablet 3    glucosamine-chondroitin 500-400 MG tablet Take 1 tablet by mouth 2 (two) times a day       No current facility-administered medications for this visit  Allergies   Allergen Reactions    Iodine - Food Allergy Hives       Review of Systems   Constitutional: Negative for appetite change, chills, fatigue and fever  HENT: Negative for sore throat and trouble swallowing  Eyes: Negative for redness  Respiratory: Negative for shortness of breath  Cardiovascular: Negative for chest pain and palpitations  Gastrointestinal: Negative for abdominal pain, constipation and diarrhea  Endocrine: Negative for cold intolerance and heat intolerance  Genitourinary: Negative for dysuria and hematuria  Musculoskeletal: Negative for back pain and neck pain  Skin: Negative for rash  Neurological: Negative for seizures, weakness and headaches  Hematological: Negative for adenopathy  Psychiatric/Behavioral: Negative for confusion  The patient is not nervous/anxious  Video Exam    Vitals:    05/11/21 1619 05/11/21 1719   BP: 155/90 149/85   Pulse: 63 63   Weight: 118 kg (260 lb)    Height: 5' 9" (1 753 m)        Physical Exam  Constitutional:       Appearance: Normal appearance  He is normal weight  HENT:      Head: Normocephalic and atraumatic  Nose: Nose normal       Mouth/Throat:      Mouth: Mucous membranes are moist    Eyes:      Extraocular Movements: Extraocular movements intact  Pupils: Pupils are equal, round, and reactive to light  Neck:      Musculoskeletal: Normal range of motion and neck supple  Pulmonary:      Effort: Pulmonary effort is normal    Abdominal:      Palpations: Abdomen is soft  Musculoskeletal: Normal range of motion  Neurological:      General: No focal deficit present  Mental Status: He is alert and oriented to person, place, and time  Mental status is at baseline  I spent 15 minutes directly with the patient during this visit    BMI Counseling: Body mass index is 38 4 kg/m²  The BMI is above normal  Nutrition recommendations include reducing portion sizes, decreasing overall calorie intake and 3-5 servings of fruits/vegetables daily  VIRTUAL VISIT DISCLAIMER    Leana Johnson acknowledges that he has consented to an online visit or consultation  He understands that the online visit is based solely on information provided by him, and that, in the absence of a face-to-face physical evaluation by the physician, the diagnosis he receives is both limited and provisional in terms of accuracy and completeness  This is not intended to replace a full medical face-to-face evaluation by the physician  Leana Johnson understands and accepts these terms

## 2021-06-14 ENCOUNTER — TELEMEDICINE (OUTPATIENT)
Dept: INTERNAL MEDICINE CLINIC | Facility: CLINIC | Age: 76
End: 2021-06-14
Payer: COMMERCIAL

## 2021-06-14 VITALS
BODY MASS INDEX: 36.73 KG/M2 | HEART RATE: 63 BPM | SYSTOLIC BLOOD PRESSURE: 137 MMHG | HEIGHT: 69 IN | WEIGHT: 248 LBS | DIASTOLIC BLOOD PRESSURE: 69 MMHG

## 2021-06-14 DIAGNOSIS — Z11.59 NEED FOR HEPATITIS C SCREENING TEST: ICD-10-CM

## 2021-06-14 DIAGNOSIS — I10 ESSENTIAL HYPERTENSION: ICD-10-CM

## 2021-06-14 DIAGNOSIS — E66.09 CLASS 2 OBESITY DUE TO EXCESS CALORIES WITHOUT SERIOUS COMORBIDITY WITH BODY MASS INDEX (BMI) OF 37.0 TO 37.9 IN ADULT: ICD-10-CM

## 2021-06-14 DIAGNOSIS — E66.9 OBESITY (BMI 30-39.9): ICD-10-CM

## 2021-06-14 DIAGNOSIS — E03.9 ACQUIRED HYPOTHYROIDISM: Primary | ICD-10-CM

## 2021-06-14 DIAGNOSIS — E78.2 MIXED HYPERLIPIDEMIA: ICD-10-CM

## 2021-06-14 DIAGNOSIS — Z95.0 PACEMAKER: ICD-10-CM

## 2021-06-14 PROCEDURE — 3008F BODY MASS INDEX DOCD: CPT | Performed by: INTERNAL MEDICINE

## 2021-06-14 PROCEDURE — 99214 OFFICE O/P EST MOD 30 MIN: CPT | Performed by: INTERNAL MEDICINE

## 2021-06-14 RX ORDER — LEVOTHYROXINE SODIUM 0.12 MG/1
125 TABLET ORAL DAILY
Qty: 90 TABLET | Refills: 1 | Status: SHIPPED | OUTPATIENT
Start: 2021-06-14 | End: 2021-09-17 | Stop reason: SDUPTHER

## 2021-06-16 ENCOUNTER — TELEPHONE (OUTPATIENT)
Dept: INTERNAL MEDICINE CLINIC | Facility: CLINIC | Age: 76
End: 2021-06-16

## 2021-06-16 NOTE — TELEPHONE ENCOUNTER
----- Message from Tita Wu MD sent at 6/11/2021  5:38 PM EDT -----  I believe he is still in [place]  Scheduled for tele visit on Monday  I need all his blood pressure readings

## 2021-06-16 NOTE — TELEPHONE ENCOUNTER
Patient had a televisit with you on Monday, 6-14-21        Does not know why he would need another visit

## 2021-06-20 NOTE — PROGRESS NOTES
Virtual Regular Visit      Assessment/Plan: This is a 41-year-old gentleman with a history of sick sinus syndrome hyperlipidemia hypothyroidism obesity and transient global amnesia  He states that he has been doing well while in his intermediate community in [place]  Denies any chest pain or shortness of breath  Problem List Items Addressed This Visit        Endocrine    Hypothyroidism - Primary     Labs were reviewed and feature labs were ordered  Relevant Medications    levothyroxine 125 mcg tablet       Other    Hyperlipidemia    Obesity, unspecified     Low-calorie high-fiber diet with limited amounts of protein was recommended  Pacemaker     He will continue to follow-up closely with Cardiology  Obesity (BMI 30-39  9)      Other Visit Diagnoses     Essential hypertension        Patient was advised to monitor his blood pressure 3 times a week with his home blood pressure monitor and upload his readings  Need for hepatitis C screening test                   Reason for visit is   Chief Complaint   Patient presents with    Virtual Regular Visit        Encounter provider Ran Finnegan MD    Provider located at [address] [id number] [phone]      Recent Visits  Date Type Provider Dept   06/16/21 Telephone MD [address] [facility]   06/14/21 Telemedicine MD [address] Internal [facility] [address] recent visits within past 7 days and meeting all other requirements  Future Appointments  No visits were found meeting these conditions  Showing future appointments within next 150 days and meeting all other requirements       The patient was identified by name and date of birth   Endy Daley was informed that this is a telemedicine visit and that the visit is being conducted through [address] [place] [address] and patient was informed that this is not a secure, HIPAA-compliant platform  He agrees to proceed     My office door was closed  No one else was in the room  He acknowledged consent and understanding of privacy and security of the video platform  The patient has agreed to participate and understands they can discontinue the visit at any time  Patient is aware this is a billable service  Subjective  Darcy Vasquez is a 77 y o  male with a history of sick sinus syndrome and pacemaker insertion         This is a 78-year-old gentleman with a history of sick sinus syndrome hyperlipidemia hypothyroidism obesity and transient global amnesia  He states that he has been doing well while in his [facility] community in [place]  Denies any chest pain or shortness of breath  Past Medical History:   Diagnosis Date    Disease of thyroid gland        History reviewed  No pertinent surgical history  Current Outpatient Medications   Medication Sig Dispense Refill    levothyroxine 125 mcg tablet Take 1 tablet (125 mcg total) by mouth daily 90 tablet 1    Red Yeast Rice 600 MG CAPS Take 600 mg by mouth 2 (two) times a day       No current facility-administered medications for this visit  Allergies   Allergen Reactions    Iodine - Food Allergy Hives       Review of Systems   Constitutional: Negative for appetite change, chills, fatigue and fever  HENT: Negative for sore throat and trouble swallowing  Eyes: Negative for redness  Respiratory: Negative for shortness of breath  Cardiovascular: Negative for chest pain and palpitations  Gastrointestinal: Negative for abdominal pain, constipation and diarrhea  Genitourinary: Negative for dysuria and hematuria  Musculoskeletal: Negative for back pain and neck pain  Skin: Negative for rash  Neurological: Negative for seizures, weakness and headaches  Hematological: Negative for adenopathy  Psychiatric/Behavioral: Negative for confusion  The patient is not nervous/anxious          Video Exam    Vitals:    06/14/21 1059   BP: 137/69   BP Location: Left arm   Pulse: 63   Weight: 112 kg (248 lb)   Height: 5' 9" (1 753 m)       Physical Exam  Constitutional:       Appearance: Normal appearance  He is obese  HENT:      Head: Normocephalic and atraumatic  Nose: Nose normal       Mouth/Throat:      Mouth: Mucous membranes are moist    Eyes:      Extraocular Movements: Extraocular movements intact  Pupils: Pupils are equal, round, and reactive to light  Pulmonary:      Effort: Pulmonary effort is normal    Abdominal:      Palpations: Abdomen is soft  Musculoskeletal:         General: Normal range of motion  Cervical back: Normal range of motion and neck supple  Neurological:      General: No focal deficit present  Mental Status: He is alert and oriented to person, place, and time  Mental status is at baseline  I spent 25 minutes directly with the patient during this visit      VIRTUAL VISIT DISCLAIMER    Penny Hamilton acknowledges that he has consented to an online visit or consultation  He understands that the online visit is based solely on information provided by him, and that, in the absence of a face-to-face physical evaluation by the physician, the diagnosis he receives is both limited and provisional in terms of accuracy and completeness  This is not intended to replace a full medical face-to-face evaluation by the physician  Penny Hamilton understands and accepts these terms

## 2021-09-17 DIAGNOSIS — E03.9 ACQUIRED HYPOTHYROIDISM: ICD-10-CM

## 2021-09-17 RX ORDER — LEVOTHYROXINE SODIUM 0.12 MG/1
125 TABLET ORAL DAILY
Qty: 90 TABLET | Refills: 1 | Status: SHIPPED | OUTPATIENT
Start: 2021-09-17 | End: 2021-12-20 | Stop reason: SDUPTHER

## 2021-12-08 ENCOUNTER — IMMUNIZATIONS (OUTPATIENT)
Dept: FAMILY MEDICINE CLINIC | Facility: HOSPITAL | Age: 76
End: 2021-12-08

## 2021-12-08 ENCOUNTER — OFFICE VISIT (OUTPATIENT)
Dept: INTERNAL MEDICINE CLINIC | Facility: CLINIC | Age: 76
End: 2021-12-08
Payer: COMMERCIAL

## 2021-12-08 VITALS
DIASTOLIC BLOOD PRESSURE: 82 MMHG | BODY MASS INDEX: 36.88 KG/M2 | HEART RATE: 60 BPM | TEMPERATURE: 98.1 F | SYSTOLIC BLOOD PRESSURE: 130 MMHG | WEIGHT: 249 LBS | OXYGEN SATURATION: 98 % | HEIGHT: 69 IN

## 2021-12-08 DIAGNOSIS — R35.1 BPH ASSOCIATED WITH NOCTURIA: ICD-10-CM

## 2021-12-08 DIAGNOSIS — E78.2 MIXED HYPERLIPIDEMIA: Primary | ICD-10-CM

## 2021-12-08 DIAGNOSIS — E66.01 OBESITY, MORBID (HCC): ICD-10-CM

## 2021-12-08 DIAGNOSIS — Z91.89 RISK FOR CORONARY ARTERY DISEASE GREATER THAN 20% IN NEXT 10 YEARS: ICD-10-CM

## 2021-12-08 DIAGNOSIS — Z12.12 SCREENING FOR COLORECTAL CANCER: ICD-10-CM

## 2021-12-08 DIAGNOSIS — N40.1 BPH ASSOCIATED WITH NOCTURIA: ICD-10-CM

## 2021-12-08 DIAGNOSIS — E03.9 ACQUIRED HYPOTHYROIDISM: ICD-10-CM

## 2021-12-08 DIAGNOSIS — Z23 ENCOUNTER FOR IMMUNIZATION: Primary | ICD-10-CM

## 2021-12-08 DIAGNOSIS — Z12.11 SCREENING FOR COLORECTAL CANCER: ICD-10-CM

## 2021-12-08 DIAGNOSIS — Z00.00 MEDICARE ANNUAL WELLNESS VISIT, SUBSEQUENT: ICD-10-CM

## 2021-12-08 DIAGNOSIS — E66.09 CLASS 2 OBESITY DUE TO EXCESS CALORIES WITHOUT SERIOUS COMORBIDITY WITH BODY MASS INDEX (BMI) OF 37.0 TO 37.9 IN ADULT: ICD-10-CM

## 2021-12-08 DIAGNOSIS — E55.9 VITAMIN D DEFICIENCY: ICD-10-CM

## 2021-12-08 DIAGNOSIS — I49.5 SICK SINUS SYNDROME (HCC): ICD-10-CM

## 2021-12-08 PROCEDURE — 1160F RVW MEDS BY RX/DR IN RCRD: CPT | Performed by: INTERNAL MEDICINE

## 2021-12-08 PROCEDURE — 99214 OFFICE O/P EST MOD 30 MIN: CPT | Performed by: INTERNAL MEDICINE

## 2021-12-08 PROCEDURE — 3008F BODY MASS INDEX DOCD: CPT | Performed by: INTERNAL MEDICINE

## 2021-12-08 PROCEDURE — 1170F FXNL STATUS ASSESSED: CPT | Performed by: INTERNAL MEDICINE

## 2021-12-08 PROCEDURE — 3288F FALL RISK ASSESSMENT DOCD: CPT | Performed by: INTERNAL MEDICINE

## 2021-12-08 PROCEDURE — 1036F TOBACCO NON-USER: CPT | Performed by: INTERNAL MEDICINE

## 2021-12-08 PROCEDURE — 3725F SCREEN DEPRESSION PERFORMED: CPT | Performed by: INTERNAL MEDICINE

## 2021-12-08 PROCEDURE — G0439 PPPS, SUBSEQ VISIT: HCPCS | Performed by: INTERNAL MEDICINE

## 2021-12-08 PROCEDURE — 0064A COVID-19 MODERNA VACC 0.25 ML BOOSTER: CPT

## 2021-12-08 PROCEDURE — 3079F DIAST BP 80-89 MM HG: CPT | Performed by: INTERNAL MEDICINE

## 2021-12-08 PROCEDURE — 3075F SYST BP GE 130 - 139MM HG: CPT | Performed by: INTERNAL MEDICINE

## 2021-12-08 PROCEDURE — 91306 COVID-19 MODERNA VACC 0.25 ML BOOSTER: CPT

## 2021-12-08 PROCEDURE — 1125F AMNT PAIN NOTED PAIN PRSNT: CPT | Performed by: INTERNAL MEDICINE

## 2021-12-08 RX ORDER — OMEGA-3S/DHA/EPA/FISH OIL/D3 300MG-1000
400 CAPSULE ORAL DAILY
COMMUNITY

## 2021-12-17 ENCOUNTER — APPOINTMENT (OUTPATIENT)
Dept: LAB | Age: 76
End: 2021-12-17
Payer: COMMERCIAL

## 2021-12-17 ENCOUNTER — APPOINTMENT (OUTPATIENT)
Dept: LAB | Facility: CLINIC | Age: 76
End: 2021-12-17
Payer: COMMERCIAL

## 2021-12-17 DIAGNOSIS — E03.9 ACQUIRED HYPOTHYROIDISM: ICD-10-CM

## 2021-12-17 DIAGNOSIS — E55.9 VITAMIN D DEFICIENCY: ICD-10-CM

## 2021-12-17 DIAGNOSIS — R35.1 BPH ASSOCIATED WITH NOCTURIA: ICD-10-CM

## 2021-12-17 DIAGNOSIS — Z12.12 SCREENING FOR COLORECTAL CANCER: ICD-10-CM

## 2021-12-17 DIAGNOSIS — E78.2 MIXED HYPERLIPIDEMIA: ICD-10-CM

## 2021-12-17 DIAGNOSIS — Z12.11 SCREENING FOR COLORECTAL CANCER: ICD-10-CM

## 2021-12-17 DIAGNOSIS — N40.1 BPH ASSOCIATED WITH NOCTURIA: ICD-10-CM

## 2021-12-17 LAB
25(OH)D3 SERPL-MCNC: 34.5 NG/ML (ref 30–100)
ALBUMIN SERPL BCP-MCNC: 3.9 G/DL (ref 3.5–5)
ALP SERPL-CCNC: 61 U/L (ref 46–116)
ALT SERPL W P-5'-P-CCNC: 28 U/L (ref 12–78)
ANION GAP SERPL CALCULATED.3IONS-SCNC: 4 MMOL/L (ref 4–13)
AST SERPL W P-5'-P-CCNC: 9 U/L (ref 5–45)
BACTERIA UR QL AUTO: NORMAL /HPF
BASOPHILS # BLD AUTO: 0.07 THOUSANDS/ΜL (ref 0–0.1)
BASOPHILS NFR BLD AUTO: 1 % (ref 0–1)
BILIRUB SERPL-MCNC: 0.66 MG/DL (ref 0.2–1)
BILIRUB UR QL STRIP: NEGATIVE
BUN SERPL-MCNC: 19 MG/DL (ref 5–25)
CALCIUM SERPL-MCNC: 9.4 MG/DL (ref 8.3–10.1)
CHLORIDE SERPL-SCNC: 106 MMOL/L (ref 100–108)
CHOLEST SERPL-MCNC: 232 MG/DL
CLARITY UR: CLEAR
CO2 SERPL-SCNC: 27 MMOL/L (ref 21–32)
COLOR UR: YELLOW
CREAT SERPL-MCNC: 1.02 MG/DL (ref 0.6–1.3)
CRP SERPL QL: <3 MG/L
EOSINOPHIL # BLD AUTO: 0.28 THOUSAND/ΜL (ref 0–0.61)
EOSINOPHIL NFR BLD AUTO: 4 % (ref 0–6)
ERYTHROCYTE [DISTWIDTH] IN BLOOD BY AUTOMATED COUNT: 13.9 % (ref 11.6–15.1)
GFR SERPL CREATININE-BSD FRML MDRD: 71 ML/MIN/1.73SQ M
GLUCOSE P FAST SERPL-MCNC: 101 MG/DL (ref 65–99)
GLUCOSE UR STRIP-MCNC: NEGATIVE MG/DL
HCT VFR BLD AUTO: 47.6 % (ref 36.5–49.3)
HDLC SERPL-MCNC: 46 MG/DL
HGB BLD-MCNC: 15.2 G/DL (ref 12–17)
HGB UR QL STRIP.AUTO: NEGATIVE
HYALINE CASTS #/AREA URNS LPF: NORMAL /LPF
IMM GRANULOCYTES # BLD AUTO: 0.04 THOUSAND/UL (ref 0–0.2)
IMM GRANULOCYTES NFR BLD AUTO: 1 % (ref 0–2)
KETONES UR STRIP-MCNC: NEGATIVE MG/DL
LDLC SERPL CALC-MCNC: 164 MG/DL (ref 0–100)
LEUKOCYTE ESTERASE UR QL STRIP: NEGATIVE
LYMPHOCYTES # BLD AUTO: 1.98 THOUSANDS/ΜL (ref 0.6–4.47)
LYMPHOCYTES NFR BLD AUTO: 29 % (ref 14–44)
MCH RBC QN AUTO: 30.3 PG (ref 26.8–34.3)
MCHC RBC AUTO-ENTMCNC: 31.9 G/DL (ref 31.4–37.4)
MCV RBC AUTO: 95 FL (ref 82–98)
MONOCYTES # BLD AUTO: 0.49 THOUSAND/ΜL (ref 0.17–1.22)
MONOCYTES NFR BLD AUTO: 7 % (ref 4–12)
NEUTROPHILS # BLD AUTO: 4.02 THOUSANDS/ΜL (ref 1.85–7.62)
NEUTS SEG NFR BLD AUTO: 58 % (ref 43–75)
NITRITE UR QL STRIP: NEGATIVE
NON-SQ EPI CELLS URNS QL MICRO: NORMAL /HPF
NONHDLC SERPL-MCNC: 186 MG/DL
NRBC BLD AUTO-RTO: 0 /100 WBCS
PH UR STRIP.AUTO: 6 [PH]
PLATELET # BLD AUTO: 245 THOUSANDS/UL (ref 149–390)
PMV BLD AUTO: 11.6 FL (ref 8.9–12.7)
POTASSIUM SERPL-SCNC: 4.4 MMOL/L (ref 3.5–5.3)
PROT SERPL-MCNC: 7 G/DL (ref 6.4–8.2)
PROT UR STRIP-MCNC: NEGATIVE MG/DL
PSA SERPL-MCNC: 1 NG/ML (ref 0–4)
RBC # BLD AUTO: 5.02 MILLION/UL (ref 3.88–5.62)
RBC #/AREA URNS AUTO: NORMAL /HPF
SODIUM SERPL-SCNC: 137 MMOL/L (ref 136–145)
SP GR UR STRIP.AUTO: 1.02 (ref 1–1.03)
T4 FREE SERPL-MCNC: 0.91 NG/DL (ref 0.76–1.46)
TRIGL SERPL-MCNC: 108 MG/DL
TSH SERPL DL<=0.05 MIU/L-ACNC: 2.8 UIU/ML (ref 0.36–3.74)
UROBILINOGEN UR QL STRIP.AUTO: 0.2 E.U./DL
WBC # BLD AUTO: 6.88 THOUSAND/UL (ref 4.31–10.16)
WBC #/AREA URNS AUTO: NORMAL /HPF

## 2021-12-17 PROCEDURE — 84443 ASSAY THYROID STIM HORMONE: CPT

## 2021-12-17 PROCEDURE — 80061 LIPID PANEL: CPT

## 2021-12-17 PROCEDURE — 36415 COLL VENOUS BLD VENIPUNCTURE: CPT

## 2021-12-17 PROCEDURE — 85025 COMPLETE CBC W/AUTO DIFF WBC: CPT

## 2021-12-17 PROCEDURE — 86140 C-REACTIVE PROTEIN: CPT

## 2021-12-17 PROCEDURE — 81001 URINALYSIS AUTO W/SCOPE: CPT | Performed by: INTERNAL MEDICINE

## 2021-12-17 PROCEDURE — 84153 ASSAY OF PSA TOTAL: CPT

## 2021-12-17 PROCEDURE — 80053 COMPREHEN METABOLIC PANEL: CPT

## 2021-12-17 PROCEDURE — 82306 VITAMIN D 25 HYDROXY: CPT

## 2021-12-17 PROCEDURE — 84439 ASSAY OF FREE THYROXINE: CPT

## 2021-12-20 DIAGNOSIS — E03.9 ACQUIRED HYPOTHYROIDISM: ICD-10-CM

## 2021-12-20 RX ORDER — LEVOTHYROXINE SODIUM 0.12 MG/1
125 TABLET ORAL DAILY
Qty: 90 TABLET | Refills: 1 | Status: SHIPPED | OUTPATIENT
Start: 2021-12-20 | End: 2022-03-28 | Stop reason: SDUPTHER

## 2021-12-28 ENCOUNTER — APPOINTMENT (OUTPATIENT)
Dept: LAB | Facility: CLINIC | Age: 76
End: 2021-12-28
Payer: COMMERCIAL

## 2022-01-06 ENCOUNTER — TELEMEDICINE (OUTPATIENT)
Dept: INTERNAL MEDICINE CLINIC | Facility: CLINIC | Age: 77
End: 2022-01-06
Payer: COMMERCIAL

## 2022-01-06 DIAGNOSIS — Z20.822 CLOSE EXPOSURE TO COVID-19 VIRUS: Primary | ICD-10-CM

## 2022-01-06 PROCEDURE — 99213 OFFICE O/P EST LOW 20 MIN: CPT | Performed by: INTERNAL MEDICINE

## 2022-01-06 NOTE — PROGRESS NOTES
COVID-19 Outpatient Progress Note    Assessment/Plan:    Problem List Items Addressed This Visit     None         Disposition:     Patient is fully vaccinated and has received their booster shot  According to CDC guidelines, quarantine is not required after close contact exposure and they were advised to wear a mask for 10 days after the exposure  If patient were to develop symptoms, they should immediately self isolate and call our office for further guidance  Pt agreeable to get home covid test     I have spent 20 minutes directly with the patient  Greater than 50% of this time was spent in counseling/coordination of care regarding: diagnostic results, prognosis, risks and benefits of treatment options, instructions for management, patient and family education, importance of treatment compliance, risk factor reductions and impressions  Encounter provider Chilo Martínez MD    Provider located at [address] [place]  [facility] [address] [phone]    Recent Visits  No visits were found meeting these conditions  Showing recent visits within past 7 days and meeting all other requirements  Today's Visits  Date Type Provider Dept   01/06/22 Telemedicine Chilo Martínez MD [address] [facility]   Showing today's visits and meeting all other requirements  Future Appointments  No visits were found meeting these conditions  Showing future appointments within next 150 days and meeting all other requirements     This virtual check-in was done via [employer] and patient was informed that this is a secure, HIPAA-compliant platform  He agrees to proceed  Patient agrees to participate in a virtual check in via telephone or video visit instead of presenting to the office to address urgent/immediate medical needs  Patient is aware this is a billable service      After connecting through [facility], the patient was identified by name and date of birth  Sebastien Brasher was informed that this was a telemedicine visit and that the exam was being conducted confidentially over secure lines  My office door was closed  No one else was in the room  Sebastien Brasher acknowledged consent and understanding of privacy and security of the telemedicine visit  I informed the patient that I have reviewed his record in Epic and presented the opportunity for him to ask any questions regarding the visit today  The patient agreed to participate  Verification of patient location:  Patient is located in the following state in which I hold an active license: [place]    Subjective:   Sebastien Brasher is a 77 y o  male who is concerned about COVID-19  Patient is currently asymptomatic  Patient denies fever, chills, fatigue, malaise, congestion, rhinorrhea, sore throat, anosmia, loss of taste, cough, shortness of breath, chest tightness, abdominal pain, nausea, vomiting, diarrhea, myalgias and headaches       Date of symptom onset: 1/4/2022  Date of exposure: 1/1/2022  COVID-19 vaccination status: Fully vaccinated with booster    Exposure:   Contact with a person who is under investigation (PUI) for or who is positive for COVID-19 within the last 14 days?: Yes    Hospitalized recently for fever and/or lower respiratory symptoms?: No      Currently a healthcare worker that is involved in direct patient care?: No      Works in a special setting where the risk of COVID-19 transmission may be high? (this may include long-term care, correctional and intermediate facilities; homeless shelters; assisted-living facilities and group homes ): No      Resident in a special setting where the risk of COVID-19 transmission may be high? (this may include long-term care, correctional and intermediate facilities; homeless shelters; assisted-living facilities and group homes ): No      No results found for: Shilpi Mar, [address] [facility], [address] [facility], [facility], [facility]  Past Medical History:   Diagnosis Date  Disease of thyroid gland      History reviewed  No pertinent surgical history  Current Outpatient Medications   Medication Sig Dispense Refill    cholecalciferol (VITAMIN D3) 400 units tablet Take 400 Units by mouth daily      levothyroxine 125 mcg tablet Take 1 tablet (125 mcg total) by mouth daily 90 tablet 1    Red Yeast Rice 600 MG CAPS Take 600 mg by mouth 2 (two) times a day       No current facility-administered medications for this visit  Allergies   Allergen Reactions    Iodine - Food Allergy Hives       Review of Systems   Constitutional: Negative for activity change, appetite change, chills, diaphoresis, fatigue, fever and unexpected weight change  HENT: Negative for congestion, drooling, ear discharge, ear pain, facial swelling, hearing loss, postnasal drip, rhinorrhea, sinus pressure, sinus pain, sneezing, sore throat, tinnitus, trouble swallowing and voice change  Eyes: Negative for discharge  Respiratory: Negative for apnea, cough, choking, chest tightness, shortness of breath, wheezing and stridor  Cardiovascular: Negative for chest pain, palpitations and leg swelling  Gastrointestinal: Negative for abdominal distention, abdominal pain, blood in stool, constipation, diarrhea, nausea, rectal pain and vomiting  Genitourinary: Negative for dysuria, flank pain, frequency and urgency  Musculoskeletal: Negative for arthralgias, back pain, gait problem, joint swelling and myalgias  Skin: Negative for color change, pallor and rash  Neurological: Negative for dizziness and headaches  Objective:    Vitals:       Physical Exam  Constitutional:       General: He is not in acute distress  Appearance: Normal appearance  He is obese  He is not ill-appearing, toxic-appearing or diaphoretic  Neurological:      Mental Status: He is alert and oriented to person, place, and time           VIRTUAL VISIT DISCLAIMER    Charlene Amaya verbally agrees to participate in [facility]  Pt is aware that [employer] could be limited without vital signs or the ability to perform a full hands-on physical Paulie Gabrieldebbie understands he or the provider may request at any time to terminate the video visit and request the patient to seek care or treatment in person

## 2022-01-13 ENCOUNTER — RA CDI HCC (OUTPATIENT)
Dept: OTHER | Facility: HOSPITAL | Age: 77
End: 2022-01-13

## 2022-01-13 NOTE — PROGRESS NOTES
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

## 2022-01-17 ENCOUNTER — CONSULT (OUTPATIENT)
Dept: INTERNAL MEDICINE CLINIC | Facility: CLINIC | Age: 77
End: 2022-01-17
Payer: COMMERCIAL

## 2022-01-17 VITALS
DIASTOLIC BLOOD PRESSURE: 76 MMHG | OXYGEN SATURATION: 97 % | HEART RATE: 60 BPM | TEMPERATURE: 99.2 F | WEIGHT: 253.2 LBS | SYSTOLIC BLOOD PRESSURE: 140 MMHG | BODY MASS INDEX: 36.25 KG/M2 | HEIGHT: 70 IN

## 2022-01-17 DIAGNOSIS — I49.5 SICK SINUS SYNDROME (HCC): ICD-10-CM

## 2022-01-17 DIAGNOSIS — E66.01 OBESITY, MORBID (HCC): ICD-10-CM

## 2022-01-17 DIAGNOSIS — E78.2 MIXED HYPERLIPIDEMIA: ICD-10-CM

## 2022-01-17 DIAGNOSIS — H25.9 AGE-RELATED CATARACT OF BOTH EYES, UNSPECIFIED AGE-RELATED CATARACT TYPE: ICD-10-CM

## 2022-01-17 DIAGNOSIS — E03.9 ACQUIRED HYPOTHYROIDISM: ICD-10-CM

## 2022-01-17 DIAGNOSIS — Z01.818 PREOP EXAM FOR INTERNAL MEDICINE: Primary | ICD-10-CM

## 2022-01-17 PROCEDURE — 1036F TOBACCO NON-USER: CPT | Performed by: INTERNAL MEDICINE

## 2022-01-17 PROCEDURE — 99214 OFFICE O/P EST MOD 30 MIN: CPT | Performed by: INTERNAL MEDICINE

## 2022-01-17 PROCEDURE — 3078F DIAST BP <80 MM HG: CPT | Performed by: INTERNAL MEDICINE

## 2022-01-17 PROCEDURE — 1160F RVW MEDS BY RX/DR IN RCRD: CPT | Performed by: INTERNAL MEDICINE

## 2022-01-17 PROCEDURE — 3077F SYST BP >= 140 MM HG: CPT | Performed by: INTERNAL MEDICINE

## 2022-01-17 NOTE — PROGRESS NOTES
Presurgical Evaluation    Subjective:  Scheduled for cataract surgery  Denies any chest pain or shortness of breath  Has some knee pain for which he is taking ibuprofen  Patient ID: Dc De Jesus is a 69 y o  male  Chief Complaint   Patient presents with    Pre-op Exam     Pt is here for pre-op visit for cataract surgery, date of surgery 02/02/22       This is a 60-year-old gentleman with the history of decreased vision secondary to bilateral cataract  Denies any chest pain shortness of breath or any fever or chills  The following portions of the patient's history were reviewed and updated as appropriate: allergies, current medications, past family history, past medical history, past social history, past surgical history and problem list     Procedure date: February 2, 2022    Surgeon:  Dr Ada Espinal  Planned procedure:  Phacoemulsification and IOL  Diagnosis for procedure:  cataract    Prior anesthesia: Yes   General; Complications:  None / Tolerated well    CAD History: None     NOTE: Patient should see Cardiology if time available before surgery, and if appropriate  Pulmonary History: None    Renal history: None    Diabetes History:  None     Neurological History: Transient global amnesia 2019 while in [place]     On Immunosuppressant meds/biologics: No      Review of Systems   Constitutional: Negative for appetite change, chills, fatigue and fever  HENT: Negative for sore throat and trouble swallowing  Eyes: Positive for visual disturbance  Negative for redness  Respiratory: Negative for shortness of breath  Cardiovascular: Negative for chest pain and palpitations  Gastrointestinal: Negative for abdominal pain, constipation and diarrhea  Genitourinary: Negative for dysuria and hematuria  Musculoskeletal: Positive for arthralgias  Negative for back pain and neck pain  Skin: Negative for rash  Neurological: Negative for seizures, weakness and headaches     Hematological: Negative for adenopathy  Psychiatric/Behavioral: Negative for confusion  The patient is not nervous/anxious  Current Outpatient Medications   Medication Sig Dispense Refill    cholecalciferol (VITAMIN D3) 400 units tablet Take 400 Units by mouth daily      levothyroxine 125 mcg tablet Take 1 tablet (125 mcg total) by mouth daily 90 tablet 1    Red Yeast Rice 600 MG CAPS Take 600 mg by mouth 2 (two) times a day       No current facility-administered medications for this visit  Allergies on file: Iodine - food allergy    Patient Active Problem List   Diagnosis    Hyperlipidemia    Hypothyroidism    Obesity, unspecified    Pacemaker    Sick sinus syndrome ([address] )    Transient global amnesia    Obesity (BMI 30-39  9)    Obesity, morbid ([address] )        Past Medical History:   Diagnosis Date    Disease of thyroid gland        Past Surgical History:   Procedure Laterality Date    CARDIAC PACEMAKER PLACEMENT      TONSILLECTOMY         Family History   Problem Relation Age of Onset    Myasthenia gravis Mother     COPD Father        Social History     Tobacco Use    Smoking status: Never Smoker    Smokeless tobacco: Never Used    Tobacco comment: Nonsmoker - As per [employer]    Vaping Use    Vaping Use: Every day   Substance Use Topics    Alcohol use: Yes     Alcohol/week: 1 0 standard drink     Types: 1 Shots of liquor per week     Comment: socially     Drug use: Never       Objective:    Vitals:    01/17/22 0912   BP: 140/76   BP Location: Right arm   Patient Position: Sitting   Cuff Size: Large   Pulse: 60   Temp: 99 2 °F (37 3 °C)   TempSrc: Temporal   SpO2: 97%   Weight: 115 kg (253 lb 3 2 oz)   Height: 5' 10 08" (1 78 m)        Physical Exam  Constitutional:       General: He is not in acute distress  Appearance: Normal appearance  HENT:      Head: Normocephalic and atraumatic        Nose: Nose normal       Mouth/Throat:      Mouth: Mucous membranes are moist    Eyes:      Extraocular Movements: Extraocular movements intact  Pupils: Pupils are equal, round, and reactive to light  Comments: Bilateral cataract present   Cardiovascular:      Rate and Rhythm: Normal rate and regular rhythm  Pulses: Normal pulses  Heart sounds: Normal heart sounds  No murmur heard  No friction rub  Pulmonary:      Effort: Pulmonary effort is normal  No respiratory distress  Breath sounds: Normal breath sounds  No wheezing  Abdominal:      General: Abdomen is flat  Bowel sounds are normal  There is no distension  Palpations: Abdomen is soft  There is no mass  Tenderness: There is no abdominal tenderness  There is no guarding  Musculoskeletal:         General: Normal range of motion  Cervical back: Normal range of motion  Neurological:      General: No focal deficit present  Mental Status: He is alert and oriented to person, place, and time  Mental status is at baseline  Cranial Nerves: No cranial nerve deficit  Psychiatric:         Mood and Affect: Mood normal          Behavior: Behavior normal            Preop labs/testing available and reviewed: yes               EKG no    Echo no    Stress test/cath no    PFT/Cumming no    Functional capacity: Walking , 4-5 MPH               4 Mets   Pick the highest level patient can comfortably perform   4 mets or greater for surgery    RCRI  High Risk surgery? 1 Point  CAD History:         1 Point   MI; Positive Stress Test; CP due to Mi;  Nitrate Usage to control Angina;  Pathologic Q wave on EKG  CHF Active:         1 Point   Pulm Edema; Paroxysmal Nocturnal Dyspnea;  Bibasilar Rales (crackles);S3; CHF on CXR  Cerebrovascular Disease (TIA or CVA):     1 Point  DM on Insulin:        1 Point  Serum Creat >2 0 mg/dl:       1 Point          Total Points: 0     Scorin: Class I, Very Low Risk (0 4%)     1: Class II, Low risk (0 9%)     2: Class III Moderate (6 6%)     3: Class IV High (>11%)      NETO Risk: No  GFR: 71               Assessment/Plan:    Patient is medically optimized (cleared) for the planned procedure  Further testing/evaluation is not required      Postop concerns: no    Problem List Items Addressed This Visit        Endocrine    Hypothyroidism       Cardiovascular and Mediastinum    Sick sinus syndrome ([address] )       Other    Hyperlipidemia    Obesity, morbid ([address] )      Other Visit Diagnoses     Preop exam for internal medicine    -  Primary    Age-related cataract of both eyes, unspecified age-related cataract type               Diagnoses and all orders for this visit:    Preop exam for internal medicine    Age-related cataract of both eyes, unspecified age-related cataract type    Mixed hyperlipidemia    Obesity, morbid ([address] )    Sick sinus syndrome ([address] )    Acquired hypothyroidism          Pre-Surgery Instructions:   Medication Instructions    cholecalciferol (VITAMIN D3) 400 units tablet per anesthesia guidelines     levothyroxine 125 mcg tablet Take morning of surgery    Red Yeast Rice 600 MG CAPS per anesthesia guidelines

## 2022-02-21 ENCOUNTER — RA CDI HCC (OUTPATIENT)
Dept: OTHER | Facility: HOSPITAL | Age: 77
End: 2022-02-21

## 2022-02-21 NOTE — PROGRESS NOTES
[facility]  coding opportunities       Chart reviewed, no opportunity found: CHART REVIEWED, NO OPPORTUNITY FOUND                        Patients insurance company: [employer]

## 2022-03-28 DIAGNOSIS — E03.9 ACQUIRED HYPOTHYROIDISM: ICD-10-CM

## 2022-03-28 RX ORDER — LEVOTHYROXINE SODIUM 0.12 MG/1
125 TABLET ORAL DAILY
Qty: 90 TABLET | Refills: 1 | Status: SHIPPED | OUTPATIENT
Start: 2022-03-28 | End: 2022-06-27 | Stop reason: SDUPTHER

## 2022-03-28 NOTE — TELEPHONE ENCOUNTER
PT spouse called requesting refill request  Currently in [place] , updated pt pharmacy  Stated he will be back in the fall   Requested 90 day supply

## 2022-06-25 DIAGNOSIS — E03.9 ACQUIRED HYPOTHYROIDISM: ICD-10-CM

## 2022-06-27 RX ORDER — LEVOTHYROXINE SODIUM 0.12 MG/1
125 TABLET ORAL DAILY
Qty: 90 TABLET | Refills: 0 | Status: SHIPPED | OUTPATIENT
Start: 2022-06-27

## 2022-06-27 NOTE — TELEPHONE ENCOUNTER
From: Auston Fleischer  To: Office of Bismark Nichole MD  Sent: 6/25/2022 11:49 AM EDT  Subject: Medication Renewal Request    Refills have been requested for the following medications: Other - My wife Naya Delatorre would also like to have a refill of her Amlodipine please, do day supply! Preferred pharmacy: [id number] [place], [address]      Medication renewals requested in this message routed separately:     levothyroxine 125 mcg tablet   Patient Comment: Please send both renewals to [facility], [address] [facility]  [employer]  Fax #[phone]!  Thanx

## 2022-08-02 ENCOUNTER — TELEPHONE (OUTPATIENT)
Dept: INTERNAL MEDICINE CLINIC | Facility: CLINIC | Age: 77
End: 2022-08-02

## 2022-08-02 NOTE — TELEPHONE ENCOUNTER
Left message for pt to call back, needs awv after 12/8   He usually comes in with his wife who is also due after this date

## 2022-09-15 DIAGNOSIS — E03.9 ACQUIRED HYPOTHYROIDISM: ICD-10-CM

## 2022-09-15 NOTE — TELEPHONE ENCOUNTER
patient called and needs his refill on his  Levothyroxine 125 mg    Last ovs 01/17/2022  Next ovs 12/07/2022

## 2022-09-16 RX ORDER — LEVOTHYROXINE SODIUM 0.12 MG/1
125 TABLET ORAL DAILY
Qty: 90 TABLET | Refills: 0 | Status: SHIPPED | OUTPATIENT
Start: 2022-09-16

## 2022-11-29 ENCOUNTER — APPOINTMENT (OUTPATIENT)
Dept: LAB | Facility: CLINIC | Age: 77
End: 2022-11-29

## 2022-11-29 DIAGNOSIS — Z01.89 RADIOLOGICAL EXAMINATION, NOT ELSEWHERE CLASSIFIED: ICD-10-CM

## 2022-11-29 DIAGNOSIS — Z01.818 OTHER SPECIFIED PRE-OPERATIVE EXAMINATION: ICD-10-CM

## 2022-11-29 LAB
ALBUMIN SERPL BCP-MCNC: 3.8 G/DL (ref 3.5–5)
ALP SERPL-CCNC: 63 U/L (ref 46–116)
ALT SERPL W P-5'-P-CCNC: 23 U/L (ref 12–78)
ANION GAP SERPL CALCULATED.3IONS-SCNC: 4 MMOL/L (ref 4–13)
AST SERPL W P-5'-P-CCNC: 10 U/L (ref 5–45)
BASOPHILS # BLD AUTO: 0.06 THOUSANDS/ÂΜL (ref 0–0.1)
BASOPHILS NFR BLD AUTO: 1 % (ref 0–1)
BILIRUB SERPL-MCNC: 0.55 MG/DL (ref 0.2–1)
BUN SERPL-MCNC: 22 MG/DL (ref 5–25)
CALCIUM SERPL-MCNC: 9.3 MG/DL (ref 8.3–10.1)
CHLORIDE SERPL-SCNC: 108 MMOL/L (ref 96–108)
CO2 SERPL-SCNC: 29 MMOL/L (ref 21–32)
CREAT SERPL-MCNC: 1.02 MG/DL (ref 0.6–1.3)
EOSINOPHIL # BLD AUTO: 0.22 THOUSAND/ÂΜL (ref 0–0.61)
EOSINOPHIL NFR BLD AUTO: 3 % (ref 0–6)
ERYTHROCYTE [DISTWIDTH] IN BLOOD BY AUTOMATED COUNT: 14.1 % (ref 11.6–15.1)
GFR SERPL CREATININE-BSD FRML MDRD: 71 ML/MIN/1.73SQ M
GLUCOSE SERPL-MCNC: 91 MG/DL (ref 65–140)
HCT VFR BLD AUTO: 47.5 % (ref 36.5–49.3)
HGB BLD-MCNC: 14.9 G/DL (ref 12–17)
IMM GRANULOCYTES # BLD AUTO: 0.06 THOUSAND/UL (ref 0–0.2)
IMM GRANULOCYTES NFR BLD AUTO: 1 % (ref 0–2)
LYMPHOCYTES # BLD AUTO: 1.46 THOUSANDS/ÂΜL (ref 0.6–4.47)
LYMPHOCYTES NFR BLD AUTO: 21 % (ref 14–44)
MCH RBC QN AUTO: 31 PG (ref 26.8–34.3)
MCHC RBC AUTO-ENTMCNC: 31.4 G/DL (ref 31.4–37.4)
MCV RBC AUTO: 99 FL (ref 82–98)
MONOCYTES # BLD AUTO: 0.43 THOUSAND/ÂΜL (ref 0.17–1.22)
MONOCYTES NFR BLD AUTO: 6 % (ref 4–12)
NEUTROPHILS # BLD AUTO: 4.85 THOUSANDS/ÂΜL (ref 1.85–7.62)
NEUTS SEG NFR BLD AUTO: 68 % (ref 43–75)
NRBC BLD AUTO-RTO: 0 /100 WBCS
PLATELET # BLD AUTO: 263 THOUSANDS/UL (ref 149–390)
PMV BLD AUTO: 11.3 FL (ref 8.9–12.7)
POTASSIUM SERPL-SCNC: 4.3 MMOL/L (ref 3.5–5.3)
PROT SERPL-MCNC: 6.6 G/DL (ref 6.4–8.4)
RBC # BLD AUTO: 4.8 MILLION/UL (ref 3.88–5.62)
SODIUM SERPL-SCNC: 141 MMOL/L (ref 135–147)
WBC # BLD AUTO: 7.08 THOUSAND/UL (ref 4.31–10.16)

## 2022-12-07 ENCOUNTER — CONSULT (OUTPATIENT)
Dept: INTERNAL MEDICINE CLINIC | Facility: CLINIC | Age: 77
End: 2022-12-07

## 2022-12-07 VITALS
OXYGEN SATURATION: 98 % | DIASTOLIC BLOOD PRESSURE: 70 MMHG | BODY MASS INDEX: 33.21 KG/M2 | HEIGHT: 70 IN | SYSTOLIC BLOOD PRESSURE: 130 MMHG | WEIGHT: 232 LBS | HEART RATE: 57 BPM | TEMPERATURE: 98 F

## 2022-12-07 DIAGNOSIS — E03.9 ACQUIRED HYPOTHYROIDISM: ICD-10-CM

## 2022-12-07 DIAGNOSIS — E66.9 OBESITY (BMI 30-39.9): ICD-10-CM

## 2022-12-07 DIAGNOSIS — E78.2 MIXED HYPERLIPIDEMIA: ICD-10-CM

## 2022-12-07 DIAGNOSIS — I49.5 SICK SINUS SYNDROME (HCC): ICD-10-CM

## 2022-12-07 DIAGNOSIS — G47.33 OSA (OBSTRUCTIVE SLEEP APNEA): ICD-10-CM

## 2022-12-07 DIAGNOSIS — Z01.818 PREOP EXAM FOR INTERNAL MEDICINE: Primary | ICD-10-CM

## 2022-12-07 DIAGNOSIS — M17.11 PRIMARY OSTEOARTHRITIS OF RIGHT KNEE: ICD-10-CM

## 2022-12-07 DIAGNOSIS — Z95.0 PACEMAKER: ICD-10-CM

## 2022-12-07 NOTE — LETTER
December 16, 5275     MD Makenna Skelton [id number] [id number]    Patient: Toney Heart   YOB: 1945   Date of Visit: 12/7/2022       Dear Dr Ignacio Wood: Thank you for referring Toney Heart to me for evaluation  Below are my notes for this consultation  If you have questions, please do not hesitate to call me  I look forward to following your patient along with you  Sincerely,        Re Dove MD        CC: No Recipients  Re Dove MD  12/16/2022 10:25 AM  Sign when Signing Visit  Presurgical Evaluation    Subjective: Severe Right knee pain  Patient ID: Toney Heart is a 69 y o  male  Chief Complaint   Patient presents with   • Pre-op Exam     Pre op knee surgery and ekg       This is a 50-year-old gentleman who has severe pain in his knee  This is preventing him from ambulating without pain  He denies any chest pain shortness of breath or any fever or chills  The following portions of the patient's history were reviewed and updated as appropriate: allergies, current medications, past family history, past medical history, past social history, past surgical history and problem list     Procedure date: December 19, 2022    Surgeon:  Dr Laurent Bo  Planned procedure:   R knee replacement  Diagnosis for procedure:  Severe osteoarthritis    Prior anesthesia: Yes   MAC; Complications:  None / Tolerated well    CAD History: Arrhythmia  sick sinus s/p pacemaker   NOTE: Patient should see Cardiology if time available before surgery, and if appropriate  Pulmonary History: LILIA (Sleep Apnea)    Renal history: None    Diabetes History:  None     Neurological History: None     On Immunosuppressant meds/biologics: No      Review of Systems   Constitutional: Negative for appetite change, chills, fatigue and fever  HENT: Negative for sore throat and trouble swallowing  Eyes: Negative for redness  Respiratory: Negative for shortness of breath  Cardiovascular: Negative for chest pain and palpitations  Gastrointestinal: Negative for abdominal pain, constipation and diarrhea  Genitourinary: Negative for dysuria and hematuria  Musculoskeletal: Positive for arthralgias  Negative for back pain and neck pain  Skin: Negative for rash  Neurological: Negative for seizures, weakness and headaches  Hematological: Negative for adenopathy  Psychiatric/Behavioral: Negative for confusion  The patient is not nervous/anxious  Current Outpatient Medications   Medication Sig Dispense Refill   • levothyroxine 125 mcg tablet Take 1 tablet (125 mcg total) by mouth daily 90 tablet 0   • Red Yeast Rice 600 MG CAPS Take 600 mg by mouth 2 (two) times a day       No current facility-administered medications for this visit  Allergies on file: Iodine - food allergy    Patient Active Problem List   Diagnosis   • Hyperlipidemia   • Hypothyroidism   • Obesity, unspecified   • Pacemaker   • Sick sinus syndrome ([address] )   • Transient global amnesia   • Obesity (BMI 30-39  9)   • Obesity, morbid ([address] )        Past Medical History:   Diagnosis Date   • Disease of thyroid gland        Past Surgical History:   Procedure Laterality Date   • CARDIAC PACEMAKER PLACEMENT     • TONSILLECTOMY         Family History   Problem Relation Age of Onset   • Myasthenia gravis Mother    • COPD Father        Social History     Tobacco Use   • Smoking status: Never   • Smokeless tobacco: Never   • Tobacco comments:     Nonsmoker - As per [employer]    Vaping Use   • Vaping Use: Every day   Substance Use Topics   • Alcohol use:  Yes     Alcohol/week: 1 0 standard drink     Types: 1 Shots of liquor per week     Comment: socially    • Drug use: Never       Objective:    Vitals:    12/07/22 1105   BP: 130/70   BP Location: Left arm   Patient Position: Sitting   Cuff Size: Large   Pulse: 57   Temp: 98 °F (36 7 °C)   TempSrc: Temporal   SpO2: 98%   Weight: 105 kg (232 lb)   Height: 5' 10" (1 778 m)       Physical Exam  Constitutional:       General: He is not in acute distress  Appearance: Normal appearance  HENT:      Head: Normocephalic and atraumatic  Right Ear: Tympanic membrane normal       Left Ear: Tympanic membrane normal       Nose: Nose normal       Mouth/Throat:      Mouth: Mucous membranes are moist    Eyes:      Extraocular Movements: Extraocular movements intact  Pupils: Pupils are equal, round, and reactive to light  Cardiovascular:      Rate and Rhythm: Normal rate and regular rhythm  Pulses: Normal pulses  Heart sounds: Normal heart sounds  No murmur heard  No friction rub  Pulmonary:      Effort: Pulmonary effort is normal  No respiratory distress  Breath sounds: Normal breath sounds  No wheezing  Abdominal:      General: Abdomen is flat  Bowel sounds are normal  There is no distension  Palpations: Abdomen is soft  There is no mass  Tenderness: There is no abdominal tenderness  There is no guarding  Musculoskeletal:         General: Swelling and tenderness present  Normal range of motion  Cervical back: Normal range of motion  Neurological:      General: No focal deficit present  Mental Status: He is alert and oriented to person, place, and time  Mental status is at baseline  Cranial Nerves: No cranial nerve deficit  Psychiatric:         Mood and Affect: Mood normal          Behavior: Behavior normal           Preop labs/testing available and reviewed: yes    eGFR   Date Value Ref Range Status   11/29/2022 71 ml/min/1 73sq m Final     WBC   Date Value Ref Range Status   11/29/2022 7 08 4 31 - 10 16 Thousand/uL Final          EKG yes    Echo no    Stress test/cath no    PFT/[place] no    Functional capacity: Shovel snow                          6 Mets   Pick the highest level patient can comfortably perform   4 mets or greater for surgery    RCRI  High Risk surgery?          1 Point  CAD History:         1 Point   MI; Positive Stress Test; CP due to Mi;  Nitrate Usage to control Angina; Pathologic Q wave on EKG  CHF Active:         1 Point   Pulm Edema; Paroxysmal Nocturnal Dyspnea;  Bibasilar Rales (crackles);S3; CHF on CXR  Cerebrovascular Disease (TIA or CVA):     1 Point  DM on Insulin:        1 Point  Serum Creat >2 0 mg/dl:       1 Point          Total Points: 0     Scorin: Class I, Very Low Risk (0 4%)     1: Class II, Low risk (0 9%)     2: Class III Moderate (6 6%)     3: Class IV High (>11%)      NETO Risk:  GFR:   eGFR   Date Value Ref Range Status   2022 71 ml/min/1 73sq m Final         For PCP:  If GFR>60, Hold ACE/ARB/Diuretic on the day of surgery, and NSAIDS 10 days before  If GFR<45, Consider PRE and POST op Nephrology Consult  If 46 <GFR> 59 : Has Patient had NETO in last 6 Months? no   If YES: Preop Nephrology consult   If No:  [facility] Nephrology consult  Assessment/Plan:    Patient is medically optimized (cleared) for the planned procedure  Further testing/evaluation is required  Postop concerns: no    Problem List Items Addressed This Visit        Endocrine    Hypothyroidism       Cardiovascular and Mediastinum    Sick sinus syndrome ([address] )       Other    Hyperlipidemia    Pacemaker    Obesity (BMI 30-39  9)   Other Visit Diagnoses     Preop exam for internal medicine    -  Primary    Okay to proceed with surgery  Primary osteoarthritis of right knee        LILIA (obstructive sleep apnea)              Diagnoses and all orders for this visit:    Preop exam for internal medicine  Comments:  Okay to proceed with surgery  Primary osteoarthritis of right knee    Sick sinus syndrome (HCC)    LILIA (obstructive sleep apnea)    Obesity (BMI 30-39  9)    Mixed hyperlipidemia    Acquired hypothyroidism    Pacemaker

## 2022-12-07 NOTE — PROGRESS NOTES
Presurgical Evaluation    Subjective: Severe Right knee pain  Patient ID: Mando San is a 69 y o  male  Chief Complaint   Patient presents with   • Pre-op Exam     Pre op knee surgery and ekg       This is a 73-year-old gentleman who has severe pain in his knee  This is preventing him from ambulating without pain  He denies any chest pain shortness of breath or any fever or chills  The following portions of the patient's history were reviewed and updated as appropriate: allergies, current medications, past family history, past medical history, past social history, past surgical history and problem list     Procedure date: December 19, 2022    Surgeon:  Dr Kelly Mcdonald  Planned procedure:   R knee replacement  Diagnosis for procedure:  Severe osteoarthritis    Prior anesthesia: Yes   MAC; Complications:  None / Tolerated well    CAD History: Arrhythmia  sick sinus s/p pacemaker   NOTE: Patient should see Cardiology if time available before surgery, and if appropriate  Pulmonary History: LILIA (Sleep Apnea)    Renal history: None    Diabetes History:  None     Neurological History: None     On Immunosuppressant meds/biologics: No      Review of Systems   Constitutional: Negative for appetite change, chills, fatigue and fever  HENT: Negative for sore throat and trouble swallowing  Eyes: Negative for redness  Respiratory: Negative for shortness of breath  Cardiovascular: Negative for chest pain and palpitations  Gastrointestinal: Negative for abdominal pain, constipation and diarrhea  Genitourinary: Negative for dysuria and hematuria  Musculoskeletal: Positive for arthralgias  Negative for back pain and neck pain  Skin: Negative for rash  Neurological: Negative for seizures, weakness and headaches  Hematological: Negative for adenopathy  Psychiatric/Behavioral: Negative for confusion  The patient is not nervous/anxious            Current Outpatient Medications   Medication Sig Dispense Refill   • levothyroxine 125 mcg tablet Take 1 tablet (125 mcg total) by mouth daily 90 tablet 0   • Red Yeast Rice 600 MG CAPS Take 600 mg by mouth 2 (two) times a day       No current facility-administered medications for this visit  Allergies on file: Iodine - food allergy    Patient Active Problem List   Diagnosis   • Hyperlipidemia   • Hypothyroidism   • Obesity, unspecified   • Pacemaker   • Sick sinus syndrome ([address] )   • Transient global amnesia   • Obesity (BMI 30-39  9)   • Obesity, morbid ([address] )        Past Medical History:   Diagnosis Date   • Disease of thyroid gland        Past Surgical History:   Procedure Laterality Date   • CARDIAC PACEMAKER PLACEMENT     • TONSILLECTOMY         Family History   Problem Relation Age of Onset   • Myasthenia gravis Mother    • COPD Father        Social History     Tobacco Use   • Smoking status: Never   • Smokeless tobacco: Never   • Tobacco comments:     Nonsmoker - As per [employer]    Vaping Use   • Vaping Use: Every day   Substance Use Topics   • Alcohol use: Yes     Alcohol/week: 1 0 standard drink     Types: 1 Shots of liquor per week     Comment: socially    • Drug use: Never       Objective:    Vitals:    12/07/22 1105   BP: 130/70   BP Location: Left arm   Patient Position: Sitting   Cuff Size: Large   Pulse: 57   Temp: 98 °F (36 7 °C)   TempSrc: Temporal   SpO2: 98%   Weight: 105 kg (232 lb)   Height: 5' 10" (1 778 m)        Physical Exam  Constitutional:       General: He is not in acute distress  Appearance: Normal appearance  HENT:      Head: Normocephalic and atraumatic  Right Ear: Tympanic membrane normal       Left Ear: Tympanic membrane normal       Nose: Nose normal       Mouth/Throat:      Mouth: Mucous membranes are moist    Eyes:      Extraocular Movements: Extraocular movements intact  Pupils: Pupils are equal, round, and reactive to light  Cardiovascular:      Rate and Rhythm: Normal rate and regular rhythm  Pulses: Normal pulses  Heart sounds: Normal heart sounds  No murmur heard  No friction rub  Pulmonary:      Effort: Pulmonary effort is normal  No respiratory distress  Breath sounds: Normal breath sounds  No wheezing  Abdominal:      General: Abdomen is flat  Bowel sounds are normal  There is no distension  Palpations: Abdomen is soft  There is no mass  Tenderness: There is no abdominal tenderness  There is no guarding  Musculoskeletal:         General: Swelling and tenderness present  Normal range of motion  Cervical back: Normal range of motion  Neurological:      General: No focal deficit present  Mental Status: He is alert and oriented to person, place, and time  Mental status is at baseline  Cranial Nerves: No cranial nerve deficit  Psychiatric:         Mood and Affect: Mood normal          Behavior: Behavior normal            Preop labs/testing available and reviewed: yes    eGFR   Date Value Ref Range Status   2022 71 ml/min/1 73sq m Final     WBC   Date Value Ref Range Status   2022 7 08 4 31 - 10 16 Thousand/uL Final          EKG yes    Echo no    Stress test/cath no    PFT/[place] no    Functional capacity: Shovel snow                          6 Mets   Pick the highest level patient can comfortably perform   4 mets or greater for surgery    RCRI  High Risk surgery? 1 Point  CAD History:         1 Point   MI; Positive Stress Test; CP due to Mi;  Nitrate Usage to control Angina;  Pathologic Q wave on EKG  CHF Active:         1 Point   Pulm Edema; Paroxysmal Nocturnal Dyspnea;  Bibasilar Rales (crackles);S3; CHF on CXR  Cerebrovascular Disease (TIA or CVA):     1 Point  DM on Insulin:        1 Point  Serum Creat >2 0 mg/dl:       1 Point          Total Points: 0     Scorin: Class I, Very Low Risk (0 4%)     1: Class II, Low risk (0 9%)     2: Class III Moderate (6 6%)     3: Class IV High (>11%)      NETO Risk:  GFR:   eGFR   Date Value Ref Range Status   11/29/2022 71 ml/min/1 73sq m Final         For PCP:  If GFR>60, Hold ACE/ARB/Diuretic on the day of surgery, and NSAIDS 10 days before  If GFR<45, Consider PRE and POST op Nephrology Consult  If 46 <GFR> 59 : Has Patient had NETO in last 6 Months? no   If YES: Preop Nephrology consult   If No:  [facility] Nephrology consult  Assessment/Plan:    Patient is medically optimized (cleared) for the planned procedure  Further testing/evaluation is required  Postop concerns: no    Problem List Items Addressed This Visit        Endocrine    Hypothyroidism       Cardiovascular and Mediastinum    Sick sinus syndrome ([address] )       Other    Hyperlipidemia    Pacemaker    Obesity (BMI 30-39  9)   Other Visit Diagnoses     Preop exam for internal medicine    -  Primary    Okay to proceed with surgery  Primary osteoarthritis of right knee        LILIA (obstructive sleep apnea)               Diagnoses and all orders for this visit:    Preop exam for internal medicine  Comments:  Okay to proceed with surgery  Primary osteoarthritis of right knee    Sick sinus syndrome (HCC)    LILIA (obstructive sleep apnea)    Obesity (BMI 30-39  9)    Mixed hyperlipidemia    Acquired hypothyroidism    Pacemaker

## 2022-12-08 ENCOUNTER — EVALUATION (OUTPATIENT)
Dept: PHYSICAL THERAPY | Facility: CLINIC | Age: 77
End: 2022-12-08

## 2022-12-08 VITALS — SYSTOLIC BLOOD PRESSURE: 103 MMHG | DIASTOLIC BLOOD PRESSURE: 65 MMHG

## 2022-12-08 DIAGNOSIS — M25.561 CHRONIC PAIN OF RIGHT KNEE: Primary | ICD-10-CM

## 2022-12-08 DIAGNOSIS — G89.29 CHRONIC PAIN OF RIGHT KNEE: Primary | ICD-10-CM

## 2022-12-08 NOTE — PROGRESS NOTES
PT Evaluation     Today's date: 2022  Patient name: Deandre Hameed  : 1945   MRN: [id number]  Referring provider: Kennedi Pavon MD  Dx:   Encounter Diagnosis     ICD-10-CM    1  Chronic pain of right knee  M25 561     G89 29           Start Time: 1725  Stop Time: 1805  Total time in clinic (min): 40 minutes    Assessment  Assessment details: Moy Pisano is a pleasant 70 yo male presenting to OP PT for pre op Physical therapy evaluation, with TKA scheduled for 2022  Pt current functional deficits include limited tolerance to prolonged standing, walking, stairclimbing, and squatting  Pt demonstrates antalgic gait and pain at rest, nonreciprocal stair negotiation, and poor squat form  Pt LE ROM 0-125, strength 5/5 for knee flex/ext of RLE  Pt will complete 1 more session of PT and was given HEP for exercises to continue up until day to surgery  Will update program NV  Pt would benefit from skilled PT to address stated deficits and improve preparation prior to R TKA  Impairments: abnormal gait, abnormal or restricted ROM, activity intolerance, impaired physical strength, pain with function and weight-bearing intolerance    Symptom irritability: moderateUnderstanding of Dx/Px/POC: good   Prognosis: good    Goals  1  Decrease pain by 50% in 4 weeks to improve tolerance to prolonged amb, standing, and reciprocal stair climbing  2  Increase left lower extremity strength golbally to 4/5 in 6 weeks  3  Increase left hip abductor and external rotator strength strength to 4+/5  6 weeks  4  Pt will demonstrate 10- 20 deg improvement in Left knee flex/ext  to increase tolerance to getting into/out of her chair, and putting on shoes/socks    1  Return to Prior Level of Function in 8 weeks  2  Pt will demonstrate [place] with progressive home exercise program to improve carryover and decrease recurrence of symptoms    3  Pt will demonstrate 20% improvement in FOTO score to increase tolerance to functional activities   4  Pt will demonstrate 5/5 in LE strength to allow for improved tolerance to prolonged amb/standing in 6-8 weeks      Plan  Patient would benefit from: PT eval and skilled physical therapy  Planned modality interventions: TENS, ultrasound, thermotherapy: hydrocollator packs, cryotherapy, manual electrical stimulation, low level laser therapy, unattended electrical stimulation, traction and electrical stimulation/Russian stimulation  Planned therapy interventions: therapeutic exercise, therapeutic activities, stretching, strengthening, patient education, balance, manual therapy, neuromuscular re-education, home exercise program, joint mobilization, massage, Simpson taping and gait training  Frequency: 2x week  Plan of Care beginning date: 2022  Plan of Care expiration date: 2023  Treatment plan discussed with: patient        Subjective Evaluation    History of Present Illness  Mechanism of injury: Josy Hayes is a 68 yo male presenting to OP PT for Pre-op Right TKA appointment, for Total Knee arthroplasty to be completed on 2022  Pt spends majority of the year travelling/drving RV out [address] with his wife  And spends his time working in [employer], amb on uneven surfaces, bundling wood, and lifting [employer] equipment  He has to negotiate 7 steps in RV and has sig difficulty with this  Pt goals include returning to driving, being able to set up camper, and negotiate steps getting into/out of RV             Recurrent probem    Quality of life: good    Pain  Current pain ratin  At best pain ratin  At worst pain ratin  Location: Right knee  Quality: dull ache, discomfort and grinding  Relieving factors: rest  Aggravating factors: standing, stair climbing and walking    Social Support  Steps to enter house: yes  Lives in: trailer  Lives with: spouse    Hand dominance: right  Exercise history: intermittent amb/hiking       Diagnostic Tests  X-ray: abnormal  Treatments  Previous treatment: injection treatment  Patient Goals  Patient goals for therapy: increased strength, decreased pain, improved balance and return to sport/leisure activities          Objective     Active Range of Motion   Left Knee   Normal active range of motion    Right Knee   Flexion: 125 degrees WFL and with pain  Extension: 0 degrees     Strength/Myotome Testing     Left Knee   Flexion: 5  Prone flexion: 5  Extension: 5  Quadriceps contraction: good    Right Knee   Flexion: 5  Extension: 5  Quadriceps contraction: good             Precautions: TKA RLE to be completed 12/19/22  Update HEP NV As needed    Manuals IE 12/8 12/8           Knee prom  *                                                  Neuro Re-Ed             SLS * HEP            Foam stance                                                                              Ther Ex             Quad sets 5''x20            HS sets             Heel slides  *           HR/TR  *           Standing SLR             Gastroc st             Hs st 3x15''            Mini squats             SAQ, LAQ  * add to HEP           SLR 2x10            Bridges 2x10            S/L SLR  *add to HEP           Clamshells  *add to HEP           Ther Activity             Step ups fwd/lat                          Gait Training                                       Modalities             CP prn

## 2022-12-14 ENCOUNTER — OFFICE VISIT (OUTPATIENT)
Dept: PHYSICAL THERAPY | Facility: CLINIC | Age: 77
End: 2022-12-14

## 2022-12-14 DIAGNOSIS — M25.561 CHRONIC PAIN OF RIGHT KNEE: Primary | ICD-10-CM

## 2022-12-14 DIAGNOSIS — G89.29 CHRONIC PAIN OF RIGHT KNEE: Primary | ICD-10-CM

## 2022-12-14 NOTE — PROGRESS NOTES
Daily Note     Today's date: 2022  Patient name: Twin Ross  : 1945  MRN: [id number]  Referring provider: Lila Oliver MD  Dx:   Encounter Diagnosis     ICD-10-CM    1  Chronic pain of right knee  M25 561     G89 29                      Subjective: Patient reports having a stress test earlier this date  Objective: See treatment diary below      Assessment: Tolerated treatment well  Progressed HEP prior to sx next week  Patient had all questions answered by therapist  Mild discomfort noted during session but therapist stressed importance of completion to improve outcomes after sx  Patient would benefit from continued PT      Plan: Continue per plan of care  Will see patient after R TKA completed        Precautions: TKA RLE to be completed 22  Update HEP NV As needed    Manuals IE            Knee prom  EB           Patellar PROM  EB                                     Neuro Re-Ed             SLS * HEP            Foam stance                                                                              Ther Ex             Quad sets 5''x20 10x R only            HS sets  10x            Heel slides  10x            HR/TR  20x standing            Standing SLR             Gastroc st  standing 10x5"           Hs st 3x15''            Mini squats  10x HEP           SAQ, LAQ  x10 bolster under knees            SLR 2x10            Bridges 2x10            S/L SLR   *add to HEP          Clamshells   *add to HEP          Ther Activity             Step ups fwd/lat                          Gait Training                                       Modalities             CP prn

## 2022-12-15 ENCOUNTER — TELEPHONE (OUTPATIENT)
Dept: INTERNAL MEDICINE CLINIC | Facility: CLINIC | Age: 77
End: 2022-12-15

## 2022-12-15 NOTE — TELEPHONE ENCOUNTER
Pt called and stated cardiology cleared him for surgery, waiting for Dr Tara Favre to clear him for surgery 12/19/22

## 2022-12-22 ENCOUNTER — OFFICE VISIT (OUTPATIENT)
Dept: PHYSICAL THERAPY | Facility: CLINIC | Age: 77
End: 2022-12-22

## 2022-12-22 DIAGNOSIS — Z96.651 STATUS POST TOTAL RIGHT KNEE REPLACEMENT: Primary | ICD-10-CM

## 2022-12-22 DIAGNOSIS — G89.29 CHRONIC PAIN OF RIGHT KNEE: ICD-10-CM

## 2022-12-22 DIAGNOSIS — M25.561 CHRONIC PAIN OF RIGHT KNEE: ICD-10-CM

## 2022-12-22 NOTE — LETTER
2016    Tama Dandy, MD  [address] [address]    Patient: Andrez Reed   YOB: 1945   Date of Visit: 2022     Encounter Diagnosis     ICD-10-CM    1  Status post total right knee replacement  Z96 651       2  Chronic pain of right knee  M25 561     G89 29           Dear Dr Jeanine Fagan: Thank you for your recent referral of Andrez Reed  Please review the attached evaluation summary from Lauri's recent visit  Please verify that you agree with the plan of care by signing the attached order  If you have any questions or concerns, please do not hesitate to call  I sincerely appreciate the opportunity to share in the care of one of your patients and hope to have another opportunity to work with you in the near future  Sincerely,    Duy Rodriguez, PT      Referring Provider:      I certify that I have read the below Plan of Care and certify the need for these services furnished under this plan of treatment while under my care  Tama Dandy, MD  [facility]  Via Fax: [phone]          PT Post-Operative Evaluation     Today's date: 2022  Patient name: Andrez Reed  : 1945   MRN: [id number]  Referring provider: Claudell Hertz, MD  Dx:   Encounter Diagnosis     ICD-10-CM    1  Status post total right knee replacement  Z96 651       2  Chronic pain of right knee  M25 561     G89 29                      Assessment  Assessment details: Gordo Morrell is a pleasant 70 yo male presenting to OP PT for post op Physical therapy evaluation, with TKA performed on 2022  Patient presents with pain, decreased strength, decreased ROM, decreased joint mobility, ambulatory dysfunction, joint effusion, and balance dysfunction  Due to these impairments, Patient has difficulty performing a/iadls, recreational activities and engaging in social activities   Of note, patient is most limited with R knee flexion ROM and motor control of the R LE musculature  (quadriceps mostly) which has led to diminished activity tolerance  Patient would benefit from a comprehensive HEP focusing on improving R knee ROM, R LE motor control, gait training, and functional mobility  Patient was educated on and provided with an updated, at home exercise program this date to be completed  Patient verbalized understanding of the importance of completion  Patient would benefit from skilled physical therapy to address the impairments, improve their level of function, and to improve their overall quality of life  PT POC 2x/wk for 8 weeks  Thank you for this referral     Impairments: abnormal gait, abnormal or restricted ROM, activity intolerance, impaired physical strength, lacks appropriate home exercise program, pain with function and weight-bearing intolerance    Symptom irritability: moderateUnderstanding of Dx/Px/POC: good   Prognosis: good    Goals  1  Decrease pain by 50% in 4 weeks to improve tolerance to prolonged amb, standing, and reciprocal stair climbing  2  Increase left lower extremity strength golbally to 4/5 in 6 weeks  3  Increase left hip abductor and external rotator strength strength to 4+/5  6 weeks  4  Pt will demonstrate 10- 20 deg improvement in Left knee flex/ext  to increase tolerance to getting into/out of her chair, and putting on shoes/socks    1  Return to Prior Level of Function in 8 weeks  2  Pt will demonstrate [place] with progressive home exercise program to improve carryover and decrease recurrence of symptoms  3  Pt will demonstrate 20% improvement in FOTO score to increase tolerance to functional activities   4  Pt will demonstrate 5/5 in LE strength to allow for improved tolerance to prolonged amb/standing in 6-8 weeks  5  Patient FOTO score will be greater than or equal to 69 by discharge        Plan  Patient would benefit from: PT eval and skilled physical therapy  Planned modality interventions: TENS, ultrasound, thermotherapy: hydrocollator packs, cryotherapy, manual electrical stimulation, low level laser therapy, unattended electrical stimulation, traction and electrical stimulation/Russian stimulation  Planned therapy interventions: therapeutic exercise, therapeutic activities, stretching, strengthening, patient education, balance, manual therapy, neuromuscular re-education, home exercise program, joint mobilization, massage, Simpson taping and gait training  Frequency: 2x week  Plan of Care beginning date: 2022  Plan of Care expiration date: 2023  Treatment plan discussed with: patient        Subjective Evaluation    History of Present Illness  Mechanism of injury: Lisa Engel is a 68 yo male presenting to OP PT for his 1st post op appointment with surgery being 22  Patient notes a good transition with minimal pain or discomfort  Patient reports taking the pain medication as needed but has been regularly taking baby Asprin and a "muscle relaxer" at night  Patient reports he has been active in the home and has been using a recumbent bike at home regularly which he used "4" times yesterday  He has noted that with longer distance walking, he experiences buckling of the knee  He finds minimal pain with no radiation of sxs into the lower leg  Does note some swelling on the thigh but no pain is associated with this  He has been compliant with previously provided HEP              Recurrent probem    Quality of life: good    Pain  Current pain ratin  At best pain ratin  At worst pain ratin  Location: Right knee  Quality: tight (hot)  Relieving factors: rest  Aggravating factors: standing, stair climbing and walking    Social Support  Steps to enter house: yes  Lives in: trailer  Lives with: spouse    Hand dominance: right  Exercise history: intermittent amb/hiking       Diagnostic Tests  X-ray: abnormal  Treatments  Previous treatment: injection treatment  Patient Goals  Patient goals for therapy: increased strength, decreased pain, improved balance and return to sport/leisure activities          Objective     Palpation     Right   No palpable tenderness to the distal biceps femoris, distal semimembranosus, distal semitendinosus, lateral gastrocnemius and medial gastrocnemius  Tenderness of the vastus lateralis and vastus medialis  Tenderness     Right Knee   Tenderness in the quadriceps tendon and superior patella       Active Range of Motion   Left Knee   Normal active range of motion  Flexion: 135 degrees   Extension: 0 degrees     Passive Range of Motion     Right Knee   Flexion: 85 degrees with pain  Extension: 0 degrees with pain    Mobility   Patellar Mobility:     Right Knee   WFL: medial and lateral  Hypermobile: superior and inferior     Strength/Myotome Testing     Left Knee   Flexion: 5  Prone flexion: 5  Extension: 5  Quadriceps contraction: good    Right Knee   Flexion: 3+  Extension: 3  Quadriceps contraction: good    Left Ankle/Foot   Dorsiflexion: 5  Plantar flexion: 5    Right Ankle/Foot   Dorsiflexion: 5  Plantar flexion: 5    Ambulation     Ambulation: Stairs   Ascend stairs: independent  Pattern: non-reciprocal  Railings: one rail  Descend stairs: independent  Pattern: non-reciprocal  Railings: one rail    Observational Gait   Gait: antalgic     Additional Observational Gait Details  Ambulates with use of RW; impaired TKE as patient reports some instances of buckling     Functional Assessment        Comments  5 sit to stands: 16 seconds use of UE      TU seconds use of RW    General Comments:      Knee Comments  Incision dry, intact with no increases in redness or discharge noted    Capillary refill WNL           Flowsheet Rows    Flowsheet Row Most Recent Value   PT/OT G-Codes    Current Score 47   Projected Score 69   FOTO information reviewed Yes   Assessment Type Evaluation            Precautions: TKA RLE to be completed 12/19/22      Manuals IE 12/8 12/8 IE 12/22          Knee prom  EB           Patellar PROM  EB                                     Neuro Re-Ed             SLS * HEP            Foam stance                                                                              Ther Ex             Quad sets 5''x20 10x R only  15x5"           HS sets  10x  15x5"          Glute sets   15x5"          Heel slides  10x            HR/TR  20x standing            Standing SLR             Gastroc st  standing 10x5"           Hs st 3x15''            Mini squats  10x HEP           SAQ, LAQ  x10 bolster under knees            SLR 2x10            Bridges 2x10            S/L SLR   *add to HEP          Clamshells   *add to HEP          Ther Activity             Step ups fwd/lat                          Gait Training                                       Modalities             CP prn

## 2022-12-22 NOTE — PROGRESS NOTES
PT Post-Operative Evaluation     Today's date: 2022  Patient name: Jayro Scott  : 1945   MRN: [id number]  Referring provider: Lionel Zhu MD  Dx:   Encounter Diagnosis     ICD-10-CM    1  Status post total right knee replacement  Z96 651       2  Chronic pain of right knee  M25 561     G89 29                      Assessment  Assessment details: Radha Zambrano is a pleasant 70 yo male presenting to OP PT for post op Physical therapy evaluation, with TKA performed on 2022  Patient presents with pain, decreased strength, decreased ROM, decreased joint mobility, ambulatory dysfunction, joint effusion, and balance dysfunction  Due to these impairments, Patient has difficulty performing a/iadls, recreational activities and engaging in social activities  Of note, patient is most limited with R knee flexion ROM and motor control of the R LE musculature  (quadriceps mostly) which has led to diminished activity tolerance  Patient would benefit from a comprehensive HEP focusing on improving R knee ROM, R LE motor control, gait training, and functional mobility  Patient was educated on and provided with an updated, at home exercise program this date to be completed  Patient verbalized understanding of the importance of completion  Patient would benefit from skilled physical therapy to address the impairments, improve their level of function, and to improve their overall quality of life  PT POC 2x/wk for 8 weeks  Thank you for this referral     Impairments: abnormal gait, abnormal or restricted ROM, activity intolerance, impaired physical strength, lacks appropriate home exercise program, pain with function and weight-bearing intolerance    Symptom irritability: moderateUnderstanding of Dx/Px/POC: good   Prognosis: good    Goals  1  Decrease pain by 50% in 4 weeks to improve tolerance to prolonged amb, standing, and reciprocal stair climbing     2  Increase left lower extremity strength [place] to 4/5 in 6 weeks   3  Increase left hip abductor and external rotator strength strength to 4+/5  6 weeks  4  Pt will demonstrate 10- 20 deg improvement in Left knee flex/ext  to increase tolerance to getting into/out of her chair, and putting on shoes/socks    1  Return to Prior Level of Function in 8 weeks  2  Pt will demonstrate [place] with progressive home exercise program to improve carryover and decrease recurrence of symptoms  3  Pt will demonstrate 20% improvement in FOTO score to increase tolerance to functional activities   4  Pt will demonstrate 5/5 in LE strength to allow for improved tolerance to prolonged amb/standing in 6-8 weeks  5  Patient FOTO score will be greater than or equal to 69 by discharge  Plan  Patient would benefit from: PT eval and skilled physical therapy  Planned modality interventions: TENS, ultrasound, thermotherapy: hydrocollator packs, cryotherapy, manual electrical stimulation, low level laser therapy, unattended electrical stimulation, traction and electrical stimulation/Russian stimulation  Planned therapy interventions: therapeutic exercise, therapeutic activities, stretching, strengthening, patient education, balance, manual therapy, neuromuscular re-education, home exercise program, joint mobilization, massage, Simpson taping and gait training  Frequency: 2x week  Plan of Care beginning date: 12/8/2022  Plan of Care expiration date: 2/16/2023  Treatment plan discussed with: patient        Subjective Evaluation    History of Present Illness  Mechanism of injury: Isabella Lopez is a 68 yo male presenting to OP PT for his 1st post op appointment with surgery being 12/19/22  Patient notes a good transition with minimal pain or discomfort  Patient reports taking the pain medication as needed but has been regularly taking baby Asprin and a "muscle relaxer" at night   Patient reports he has been active in the home and has been using a recumbent bike at home regularly which he used "4" times yesterday  He has noted that with longer distance walking, he experiences buckling of the knee  He finds minimal pain with no radiation of sxs into the lower leg  Does note some swelling on the thigh but no pain is associated with this  He has been compliant with previously provided HEP  Recurrent probem    Quality of life: good    Pain  Current pain ratin  At best pain ratin  At worst pain ratin  Location: Right knee  Quality: tight (hot)  Relieving factors: rest  Aggravating factors: standing, stair climbing and walking    Social Support  Steps to enter house: yes  Lives in: trailer  Lives with: spouse    Hand dominance: right  Exercise history: intermittent amb/hiking       Diagnostic Tests  X-ray: abnormal  Treatments  Previous treatment: injection treatment  Patient Goals  Patient goals for therapy: increased strength, decreased pain, improved balance and return to sport/leisure activities          Objective     Palpation     Right   No palpable tenderness to the distal biceps femoris, distal semimembranosus, distal semitendinosus, lateral gastrocnemius and medial gastrocnemius  Tenderness of the vastus lateralis and vastus medialis  Tenderness     Right Knee   Tenderness in the quadriceps tendon and superior patella       Active Range of Motion   Left Knee   Normal active range of motion  Flexion: 135 degrees   Extension: 0 degrees     Passive Range of Motion     Right Knee   Flexion: 85 degrees with pain  Extension: 0 degrees with pain    Mobility   Patellar Mobility:     Right Knee   WFL: medial and lateral  Hypermobile: superior and inferior     Strength/Myotome Testing     Left Knee   Flexion: 5  Prone flexion: 5  Extension: 5  Quadriceps contraction: good    Right Knee   Flexion: 3+  Extension: 3  Quadriceps contraction: good    Left Ankle/Foot   Dorsiflexion: 5  Plantar flexion: 5    Right Ankle/Foot   Dorsiflexion: 5  Plantar flexion: 5    Ambulation     Ambulation: Stairs   Ascend stairs: independent  Pattern: non-reciprocal  Railings: one rail  Descend stairs: independent  Pattern: non-reciprocal  Railings: one rail    Observational Gait   Gait: antalgic     Additional Observational Gait Details  Ambulates with use of RW; impaired TKE as patient reports some instances of buckling     Functional Assessment        Comments  5 sit to stands: 16 seconds use of UE      TU seconds use of RW    General Comments:      Knee Comments  Incision dry, intact with no increases in redness or discharge noted    Capillary refill WNL           Flowsheet Rows    Flowsheet Row Most Recent Value   PT/OT G-Codes    Current Score 47   Projected Score 69   FOTO information reviewed Yes   Assessment Type Evaluation             Precautions: TKA RLE to be completed 22      Manuals IE  IE           Knee prom  EB           Patellar PROM  EB                                     Neuro Re-Ed             SLS * HEP            Foam stance                                                                              Ther Ex             Quad sets 5''x20 10x R only  15x5"           HS sets  10x  15x5"          Glute sets   15x5"          Heel slides  10x            HR/TR  20x standing            Standing SLR             Gastroc st  standing 10x5"           Hs st 3x15''            Mini squats  10x HEP           SAQ, LAQ  x10 bolster under knees            SLR 2x10            Bridges 2x10            S/L SLR   *add to HEP          Clamshells   *add to HEP          Ther Activity             Step ups fwd/lat                          Gait Training                                       Modalities             CP prn

## 2022-12-27 ENCOUNTER — OFFICE VISIT (OUTPATIENT)
Dept: PHYSICAL THERAPY | Facility: CLINIC | Age: 77
End: 2022-12-27

## 2022-12-27 DIAGNOSIS — M25.561 CHRONIC PAIN OF RIGHT KNEE: Primary | ICD-10-CM

## 2022-12-27 DIAGNOSIS — Z96.651 STATUS POST TOTAL RIGHT KNEE REPLACEMENT: ICD-10-CM

## 2022-12-27 DIAGNOSIS — G89.29 CHRONIC PAIN OF RIGHT KNEE: Primary | ICD-10-CM

## 2022-12-27 NOTE — PROGRESS NOTES
Daily Note     Today's date: 2022  Patient name: Madeline Horne  : 1945  MRN: [id number]  Referring provider: Solis Michele MD  Dx:   Encounter Diagnosis     ICD-10-CM    1  Chronic pain of right knee  M25 561     G89 29       2  Status post total right knee replacement  Z96 651                      Subjective: Patient reports consistent improvements in pain with minimal discomfort  Notes being compliant with HEP as well as the TROM he rented  Patient finds it has been very easy to walk with the walker and would like to transition to using a cane  Objective: See treatment diary below  R knee PROM: 112 degrees     Assessment: Tolerated treatment well  Progressed ROM and motor control activities this date to patient tolerance  Patient almost able to achieve full revolution on recumbent bike  Good quad control as compared to last session with minimal increases in pain present and no buckling seen  R knee flexion PROM improved 27 degrees since visit last week  Extension remained at 0 degrees with minimal swelling present  Trialed cane ambulation this date in clinic with cueing required to proper usage  Recommended patient use cane for short distances with use of walker for longer, community distances  Patient would benefit from continued PT      Plan: Continue per plan of care        Precautions: TKA RLE to be completed 22      Manuals IE  IE          Knee prom  EB  EB         Patellar PROM  EB  EB                                   Neuro Re-Ed             SLS * HEP            Foam stance             Standing hip abduction             Standing hip flexion             Standing hip extension                                        Ther Ex             Bike    For ROM 6' rocking         Quad sets 5''x20 10x R only  15x5"  Slow on/off 2x10 3"          HS sets  10x  15x5" Slow on/off 2x10 3"         Glute sets   15x5"          Heel slides  10x            HR/TR  20x standing Standing SLR             Gastroc st  standing 10x5"           Hs st 3x15''            Mini squats  10x HEP  x10 from low table         SAQ, LAQ  x10 bolster under knees            SLR 2x10   2x10 w/quad set          Bridges 2x10            S/L SLR   *add to HEP          Clamshells   *add to HEP          Ther Activity             Step ups fwd/lat                          Gait Training             SPC    EB                      Modalities             CP prn

## 2022-12-29 ENCOUNTER — OFFICE VISIT (OUTPATIENT)
Dept: PHYSICAL THERAPY | Facility: CLINIC | Age: 77
End: 2022-12-29

## 2022-12-29 DIAGNOSIS — M25.561 CHRONIC PAIN OF RIGHT KNEE: Primary | ICD-10-CM

## 2022-12-29 DIAGNOSIS — G89.29 CHRONIC PAIN OF RIGHT KNEE: Primary | ICD-10-CM

## 2022-12-29 DIAGNOSIS — Z96.651 STATUS POST TOTAL RIGHT KNEE REPLACEMENT: ICD-10-CM

## 2022-12-29 NOTE — PROGRESS NOTES
Daily Note     Today's date: 2022  Patient name: Parish Alfonso  : 1945  MRN: [id number]  Referring provider: Casey Miller MD  Dx:   Encounter Diagnosis     ICD-10-CM    1  Chronic pain of right knee  M25 561     G89 29       2  Status post total right knee replacement  Z96 651                      Subjective: Patient reports being extremely sore after the last session but this improved with time  Notes that he has been consistent with his HEP and with the ROM tech  Objective: See treatment diary below  R knee PROM: 124 degrees     Assessment: Tolerated treatment well  Good carryover with proper use of SPC this date  Patient shows excellent R knee PROM this date with a 12 degree improvement since last visit  Continued to build motor control and strength of LE this date  Hesitancy to weight bear through RLE due to fear of buckling although none noted during session  Soreness post session  Updated HEP  Progress as able  Patient would benefit from continued PT      Plan: Continue per plan of care        Precautions: TKA RLE to be completed 22      Manuals IE  IE         Knee prom  EB  EB EB        Patellar PROM  EB  EB EB                                  Neuro Re-Ed             SLS * HEP            Foam stance             Standing hip abduction     2x10        Standing hip flexion     2x10         Standing hip extension      2x10                                   Ther Ex             Bike    For ROM 6' rocking 6' rocking able to get full ROM by end         Quad sets 5''x20 10x R only  15x5"  Slow on/off 2x10 3"          HS sets  10x  15x5" Slow on/off 2x10 3"         Glute sets   15x5"          Heel slides  10x    15x5"        HR/TR  20x standing            Standing SLR             Gastroc st  standing 10x5"           Hs st 3x15''            Mini squats  10x HEP  x10 from low table 3x6 from chair         SAQ, LAQ  x10 bolster under knees            SLR 2x10   2x10 w/quad set  2x10 w/quad set        Bridges 2x10    2x10         S/L SLR   *add to HEP          Clamshells   *add to HEP          Ther Activity             Step ups fwd/lat                          Gait Training             SPC    EB                      Modalities             CP prn

## 2023-01-03 ENCOUNTER — OFFICE VISIT (OUTPATIENT)
Dept: PHYSICAL THERAPY | Facility: CLINIC | Age: 78
End: 2023-01-03

## 2023-01-03 DIAGNOSIS — Z96.651 STATUS POST TOTAL RIGHT KNEE REPLACEMENT: ICD-10-CM

## 2023-01-03 DIAGNOSIS — G89.29 CHRONIC PAIN OF RIGHT KNEE: Primary | ICD-10-CM

## 2023-01-03 DIAGNOSIS — M25.561 CHRONIC PAIN OF RIGHT KNEE: Primary | ICD-10-CM

## 2023-01-03 NOTE — PROGRESS NOTES
Daily Note     Today's date: 1/3/2023  Patient name: Jeancarlos Choe  : 1945  MRN: [id number]  Referring provider: Jose Dalton MD  Dx:   Encounter Diagnosis     ICD-10-CM    1  Chronic pain of right knee  M25 561     G89 29       2  Status post total right knee replacement  Z96 651           Start Time: 1108  Stop Time: 1154  Total time in clinic (min): 46 minutes    Subjective: Patient reports some increased discomfort when sleeping a couple of nights ago but this did not continue into more nights  Patient does also note increased swelling in the ankle but has been continuing to wear his compression stockings  Has been compliant with HEP  Objective: See treatment diary below      Assessment: Tolerated treatment well  Patient able to achieve full revolution on bike this date  Continued to progress standing and table level activity this date with improved tolerance to all activity noted  Challenged SL balance this date with noted reliance on UE for balance  LOB noted without use of UE although patient able to self correct  Patient able to perform retro walking without use of cane  Patient educated on normal and abnormal swelling/pain  At this time, discomfort most likely due to increased activity levels  Progress as able  Patient would benefit from continued PT      Plan: Continue per plan of care        Precautions: TKA RLE to be completed 22      Manuals IE  IE 12/22 12/27 12/29 1/3       Knee prom  EB  EB EB EB       Patellar PROM  EB  EB EB                                  Neuro Re-Ed             SLS * HEP     4x30" R UE support       Foam stance             Standing hip abduction     2x10        Standing hip flexion     2x10         Standing hip extension      2x10          LAQ      2x15 2# focus on eccentric control                    Ther Ex             Bike    For ROM 6' rocking 6' rocking able to get full ROM by end  6' full        Quad sets 5''x20 10x R only  15x5"  Slow on/off 2x10 3"          HS sets  10x  15x5" Slow on/off 2x10 3"         Glute sets   15x5"          Heel slides  10x    15x5"        HR/TR  20x standing            Standing SLR             Gastroc st  standing 10x5"           Hs st 3x15''            Mini squats  10x HEP  x10 from low table 3x6 from chair  2x10 from chair         SAQ  x10 bolster under knees            SLR 2x10   2x10 w/quad set  2x10 w/quad set 2x15 w/quad set 2#        Bridges 2x10    2x10         S/L SLR   *add to HEP          Clamshells   *add to HEP          Ther Activity             Step ups fwd/lat      2x10 4" no UE support       Retro walking      x10 20#       Gait Training             SPC    EB                      Modalities             CP prn

## 2023-01-05 ENCOUNTER — OFFICE VISIT (OUTPATIENT)
Dept: PHYSICAL THERAPY | Facility: CLINIC | Age: 78
End: 2023-01-05

## 2023-01-05 DIAGNOSIS — Z96.651 STATUS POST TOTAL RIGHT KNEE REPLACEMENT: ICD-10-CM

## 2023-01-05 DIAGNOSIS — M25.561 CHRONIC PAIN OF RIGHT KNEE: Primary | ICD-10-CM

## 2023-01-05 DIAGNOSIS — G89.29 CHRONIC PAIN OF RIGHT KNEE: Primary | ICD-10-CM

## 2023-01-10 ENCOUNTER — OFFICE VISIT (OUTPATIENT)
Dept: PHYSICAL THERAPY | Facility: CLINIC | Age: 78
End: 2023-01-10

## 2023-01-10 DIAGNOSIS — Z96.651 STATUS POST TOTAL RIGHT KNEE REPLACEMENT: ICD-10-CM

## 2023-01-10 DIAGNOSIS — M25.561 CHRONIC PAIN OF RIGHT KNEE: Primary | ICD-10-CM

## 2023-01-10 DIAGNOSIS — G89.29 CHRONIC PAIN OF RIGHT KNEE: Primary | ICD-10-CM

## 2023-01-10 NOTE — PROGRESS NOTES
Daily Note     Today's date: 1/10/2023  Patient name: Sebastien Brasher  : 1945  MRN: [id number]  Referring provider: Ana Pino MD  Dx:   Encounter Diagnosis     ICD-10-CM    1  Chronic pain of right knee  M25 561     G89 29       2  Status post total right knee replacement  [id number] [id number]           Start Time: 1100  Stop Time: 1145  Total time in clinic (min): 45 minutes    Subjective: Patient reports minimal to no discomfort  Has been diligently performing HEP  Notes using the walker early in the morning due to stiffness  Patient will be following up with referring surgeon tomorrow,   Objective: See treatment diary below  R knee AROM:    Flexion: 130 degrees  Extension: 0 degrees    Assessment: Tolerated treatment well  Patient has achieved near full AROM of the R knee this date with mild discomfort at end range still noted  Progressed functional standing activities and provided patient with a set of exercises to be performed at home  Fatigue of the quadriceps noted during sit to stand activity  Rest breaks required throughout session  Progress as able  Patient would benefit from continued PT      Plan: Continue per plan of care        Precautions: TKA RLE to be completed 22      Manuals IE  IE 12/22 12/27 12/29 1/3 1/5 1/10     Knee prom  EB  EB EB EB EB EB     Patellar PROM  EB  EB EB                                  Neuro Re-Ed             SLS * HEP     4x30" R UE support       Foam stance             Standing hip abduction     2x10   2x10 gtb      Standing hip flexion     2x10    2x10 gtb     Standing hip extension      2x10    2x10 gtb       LAQ      2x15 2# focus on eccentric control  30x 4#      Hamstring curl         2x15 55#     Ther Ex             Bike    For ROM 6' rocking 6' rocking able to get full ROM by end  6' full  8' full  8' full      Quad sets 5''x20 10x R only  15x5"  Slow on/off 2x10 3"          HS sets  10x  15x5" Slow on/off 2x10 3"         Glute sets   15x5" Heel slides  10x    15x5"        HR/TR  20x standing            Standing SLR             Gastroc st  standing 10x5"           Hs st 3x15''            Mini squats  10x HEP  x10 from low table 3x6 from chair  2x10 from chair   2x10 from chair  2x10 from chair      SAQ  x10 bolster under knees            SLR 2x10   2x10 w/quad set  2x10 w/quad set 2x15 w/quad set 2#  2x10 3#  2x10 4#      Bridges 2x10    2x10         S/L abduction   *add to HEP    2x10 3#       Clamshells   *add to HEP          Ther Activity             Step ups fwd/lat      2x10 4" no UE support 20x fwd/lat 6"       Monster walks         5 laps gtb     Side steps        5 laps rtb  5 laps gtb      Resisted fwd walking        x10 20#       Retro walking      x10 20# x10 25#      Gait Training             SPC    EB                      Modalities             CP prn

## 2023-01-12 ENCOUNTER — OFFICE VISIT (OUTPATIENT)
Dept: PHYSICAL THERAPY | Facility: CLINIC | Age: 78
End: 2023-01-12

## 2023-01-12 DIAGNOSIS — G89.29 CHRONIC PAIN OF RIGHT KNEE: Primary | ICD-10-CM

## 2023-01-12 DIAGNOSIS — Z96.651 STATUS POST TOTAL RIGHT KNEE REPLACEMENT: ICD-10-CM

## 2023-01-12 DIAGNOSIS — M25.561 CHRONIC PAIN OF RIGHT KNEE: Primary | ICD-10-CM

## 2023-01-12 NOTE — PROGRESS NOTES
Daily Note     Today's date: 2023  Patient name: Carmita Castaneda  : 1945  MRN: [id number]  Referring provider: Remi Muse MD  Dx:   Encounter Diagnosis     ICD-10-CM    1  Chronic pain of right knee  M25 561     G89 29       2  Status post total right knee replacement  [id number] [id number]                      Subjective: Patient reports a positive office visit with the referring provider  Notes that his bandage was removed and he will be following up with them again in a month  Objective: See treatment diary below      Assessment: Tolerated treatment well  Progressed strengthening and functional mobility activities this date in standing with continued focus on improvement of quadriceps control  Improved tolerance to knee flexion continuing to be noted  Initiated leg press and knee extension machines this date to good tolerance although much fatigue present  Progress as able  Patient would benefit from continued PT      Plan: Continue per plan of care        Precautions: TKA RLE to be completed 22      Manuals IE  IE 12/22 12/27 12/29 1/3 1/5 1/10 1/12    Knee prom  EB  EB EB EB EB EB EB    Patellar PROM  EB  EB EB        Adhesive removal         EB                 Neuro Re-Ed             SLS * HEP     4x30" R UE support       Foam stance             Standing hip abduction     2x10   2x10 gtb      Standing hip flexion     2x10    2x10 gtb     Standing hip extension      2x10    2x10 gtb       LAQ      2x15 2# focus on eccentric control  30x 4#  30x 4#    Hamstring curl         2x15 55# 2x15 55#     Ther Ex             Bike    For ROM 6' rocking 6' rocking able to get full ROM by end  6' full  8' full  8' full  8;' full     Quad sets 5''x20 10x R only  15x5"  Slow on/off 2x10 3"          HS sets  10x  15x5" Slow on/off 2x10 3"         Glute sets   15x5"          Heel slides  10x    15x5"        HR/TR  20x standing            Standing SLR             Gastroc st  standing 10x5"           Hs st 3x15'' Mini squats  10x HEP  x10 from low table 3x6 from chair  2x10 from chair   2x10 from chair  2x10 from chair      SAQ  x10 bolster under knees                         Hamstring curl             Knee extension             Leg press          3x10 110#DL     SLR 2x10   2x10 w/quad set  2x10 w/quad set 2x15 w/quad set 2#  2x10 3#  2x10 4#  2x 10 4#     Bridges 2x10    2x10         S/L abduction   *add to HEP    2x10 3#       Clamshells   *add to HEP          Ther Activity             Step ups fwd/lat      2x10 4" no UE support 20x fwd/lat 6"       Monster walks         5 laps gtb     Side steps        5 laps rtb  5 laps gtb      Resisted fwd walking        x10 20#   x10 27 5#    Resisted side steps          x5 ea direction 15#     Retro walking      x10 20# x10 25#  x10 27 5#    Gait Training             SPC    EB                      Modalities             CP prn

## 2023-01-17 ENCOUNTER — OFFICE VISIT (OUTPATIENT)
Dept: PHYSICAL THERAPY | Facility: CLINIC | Age: 78
End: 2023-01-17

## 2023-01-17 DIAGNOSIS — Z96.651 STATUS POST TOTAL RIGHT KNEE REPLACEMENT: ICD-10-CM

## 2023-01-17 DIAGNOSIS — M25.561 CHRONIC PAIN OF RIGHT KNEE: Primary | ICD-10-CM

## 2023-01-17 DIAGNOSIS — G89.29 CHRONIC PAIN OF RIGHT KNEE: Primary | ICD-10-CM

## 2023-01-17 NOTE — PROGRESS NOTES
Daily Note     Today's date: 2023  Patient name: Olimpia Bonilla  : 1945  MRN: [id number]  Referring provider: Junie Waldrop MD  Dx:   Encounter Diagnosis     ICD-10-CM    1  Chronic pain of right knee  M25 561     G89 29       2  Status post total right knee replacement  Z96 [id number]                      Subjective: Patient reports that he continues to feel better and better with minimal pain or discomfort  Objective: See treatment diary below      Assessment: Tolerated treatment well  Patient reports some increased discomfort during sit to stand and knee extension activity this date, with the discomfort being most evident in the anterior knee near the distal attachment of the quadriceps  This improved with rest breaks and patient was shown alternative prone quad stretch to be added to home regiment  Large amounts of fatigue by end of session requiring increased need for rest breaks  Continue to improve functional mobility and exercise tolerance  Progress as able  Patient would benefit from continued PT      Plan: Continue per plan of care        Precautions: TKA RLE to be completed 22      Manuals IE  IE 12/22 12/27 12/29 1/3 1/5 1/10 1/12 1/17   Knee prom  EB  EB EB EB EB EB EB EB   Patellar PROM  EB  EB EB        Adhesive removal         EB                 Neuro Re-Ed             SLS * HEP     4x30" R UE support       Foam stance             Standing hip abduction     2x10   2x10 gtb      Standing hip flexion     2x10    2x10 gtb     Standing hip extension      2x10    2x10 gtb      Knee extension machine           2 up 1 down 22# x15 ea    LAQ      2x15 2# focus on eccentric control  30x 4#  30x 4# 30x 5#    Hamstring curl         2x15 55# 2x15 55#  2 up 1 down 55#  x15 ea    Ther Ex             Bike    For ROM 6' rocking 6' rocking able to get full ROM by end  6' full  8' full  8' full  8;' full  8' full   Prone quad stretch          15x5"   Standing SLR             Gastroc st  standing 10x5"           Hs st 3x15''            Mini squats  10x HEP  x10 from low table 3x6 from chair  2x10 from chair   2x10 from chair  2x10 from chair      SAQ  x10 bolster under knees                         Hamstring curl             Knee extension             Leg press          3x10 110#DL  x15 ea 90#    SLR 2x10   2x10 w/quad set  2x10 w/quad set 2x15 w/quad set 2#  2x10 3#  2x10 4#  2x 10 4#  2x10 5#    Bridges 2x10    2x10         S/L abduction   *add to HEP    2x10 3#       Sit to stands           2x10    Clamshells   *add to HEP          Ther Activity             Step ups fwd/lat      2x10 4" no UE support 20x fwd/lat 6"    6" reciprocal 4'    Monster walks         5 laps gtb     Side steps        5 laps rtb  5 laps gtb      Resisted fwd walking        x10 20#   x10 27 5#    Resisted side steps          x5 ea direction 15#     Retro walking      x10 20# x10 25#  x10 27 5#    Gait Training             SPC    EB                      Modalities             CP prn

## 2023-01-18 ENCOUNTER — RA CDI HCC (OUTPATIENT)
Dept: OTHER | Facility: HOSPITAL | Age: 78
End: 2023-01-18

## 2023-01-19 ENCOUNTER — OFFICE VISIT (OUTPATIENT)
Dept: PHYSICAL THERAPY | Facility: CLINIC | Age: 78
End: 2023-01-19

## 2023-01-19 DIAGNOSIS — G89.29 CHRONIC PAIN OF RIGHT KNEE: Primary | ICD-10-CM

## 2023-01-19 DIAGNOSIS — M25.561 CHRONIC PAIN OF RIGHT KNEE: Primary | ICD-10-CM

## 2023-01-19 DIAGNOSIS — Z96.651 STATUS POST TOTAL RIGHT KNEE REPLACEMENT: ICD-10-CM

## 2023-01-19 NOTE — PROGRESS NOTES
Daily Note     Today's date: 2023  Patient name: Gina Jacob  : 1945  MRN: [id number]  Referring provider: Binu Patel MD  Dx:   Encounter Diagnosis     ICD-10-CM    1  Chronic pain of right knee  M25 561     G89 29       2  Status post total right knee replacement  [id number] [id number]           Start Time: 1100  Stop Time: 1145  Total time in clinic (min): 45 minutes    Subjective: Patient reports no adverse side effects after the last session  Notes the quad soreness dissipated quickly  Objective: See treatment diary below      Assessment: Tolerated treatment well  Patient had a spike of pain during step down activity which he characterized as an "8 " Unable to complete rest of session but this improved to soreness with STM and mobilizations to the patella  No buckling or instability present  Cold pack applied at end of session  Encouraged patient to continue to move knee with focus on quadriceps activity to decrease knee discomfort as well as minimize provocative movements  Patient verbalized understanding to therapist  Progress as able  Patient would benefit from continued PT      Plan: Continue per plan of care        Precautions: TKA RLE completed 22      Manuals  IE 12/22 12/27 12/29 1/3 1/5 1/10 1/12 1/17   Knee prom EB EB  EB EB EB EB EB EB EB   Patellar PROM EB EB  EB EB        Adhesive removal         EB    Quad/knee STM EB            Neuro Re-Ed             SLS      4x30" R UE support       Foam stance             Standing hip abduction     2x10   2x10 gtb      Standing hip flexion     2x10    2x10 gtb     Standing hip extension      2x10    2x10 gtb      Knee extension machine           2 up 1 down 22# x15 ea    LAQ      2x15 2# focus on eccentric control  30x 4#  30x 4# 30x 5#    Hamstring curl         2x15 55# 2x15 55#  2 up 1 down 55#  x15 ea    Ther Ex             Bike 8' upright    For ROM 6' rocking 6' rocking able to get full ROM by end  6' full  8' full  8' full  8;' full 8' full   Prone quad stretch 10x10"         15x5"   Standing SLR             Gastroc st  standing 10x5"           Hs st             Mini squats  10x HEP  x10 from low table 3x6 from chair  2x10 from chair   2x10 from chair  2x10 from chair      SAQ  x10 bolster under knees                         Hamstring curl             Knee extension             Leg press          3x10 110#DL  x15 ea 90#    SLR    2x10 w/quad set  2x10 w/quad set 2x15 w/quad set 2#  2x10 3#  2x10 4#  2x 10 4#  2x10 5#    Bridges     2x10         S/L abduction   *add to HEP    2x10 3#       Sit to stands           2x10    Clamshells   *add to HEP          Ther Activity             Step ups fwd/lat      2x10 4" no UE support 20x fwd/lat 6"    6" reciprocal 4'    Monster walks  5 laps gtb        5 laps gtb     Side steps  5 laps gtb      5 laps rtb  5 laps gtb      Resisted fwd walking        x10 20#   x10 27 5#    Resisted side steps          x5 ea direction 15#     Retro walking      x10 20# x10 25#  x10 27 5#    Gait Training             SPC    EB                      Modalities             CP prn 5'

## 2023-01-19 NOTE — PROGRESS NOTES
[facility]  coding opportunities       Chart reviewed, no opportunity found:   [address]        Patients Insurance     Medicare Insurance: [employer]

## 2023-01-20 ENCOUNTER — OFFICE VISIT (OUTPATIENT)
Dept: INTERNAL MEDICINE CLINIC | Facility: CLINIC | Age: 78
End: 2023-01-20

## 2023-01-20 VITALS
SYSTOLIC BLOOD PRESSURE: 134 MMHG | OXYGEN SATURATION: 95 % | HEIGHT: 70 IN | DIASTOLIC BLOOD PRESSURE: 70 MMHG | BODY MASS INDEX: 34.36 KG/M2 | TEMPERATURE: 98 F | WEIGHT: 240 LBS | HEART RATE: 68 BPM

## 2023-01-20 DIAGNOSIS — E03.9 ACQUIRED HYPOTHYROIDISM: ICD-10-CM

## 2023-01-20 DIAGNOSIS — I49.5 SICK SINUS SYNDROME (HCC): ICD-10-CM

## 2023-01-20 DIAGNOSIS — E55.9 VITAMIN D DEFICIENCY: ICD-10-CM

## 2023-01-20 DIAGNOSIS — E66.9 OBESITY (BMI 30-39.9): ICD-10-CM

## 2023-01-20 DIAGNOSIS — E66.01 OBESITY, MORBID (HCC): ICD-10-CM

## 2023-01-20 DIAGNOSIS — E78.2 MIXED HYPERLIPIDEMIA: ICD-10-CM

## 2023-01-20 DIAGNOSIS — Z00.00 MEDICARE ANNUAL WELLNESS VISIT, SUBSEQUENT: ICD-10-CM

## 2023-01-20 DIAGNOSIS — G47.33 OSA (OBSTRUCTIVE SLEEP APNEA): Primary | ICD-10-CM

## 2023-01-20 RX ORDER — ATORVASTATIN CALCIUM 20 MG/1
20 TABLET, FILM COATED ORAL DAILY
COMMUNITY
Start: 2022-02-04 | End: 2023-01-20 | Stop reason: SDUPTHER

## 2023-01-20 RX ORDER — ATORVASTATIN CALCIUM 20 MG/1
20 TABLET, FILM COATED ORAL DAILY
Qty: 90 TABLET | Refills: 2 | Status: SHIPPED | OUTPATIENT
Start: 2023-01-20 | End: 2024-05-25

## 2023-01-20 RX ORDER — ASPIRIN 81 MG/1
81 TABLET ORAL 2 TIMES DAILY
COMMUNITY
Start: 2022-12-20 | End: 2023-01-31

## 2023-01-20 NOTE — PROGRESS NOTES
Assessment and Plan:     Problem List Items Addressed This Visit        Endocrine    Hypothyroidism    Relevant Orders    T4, free    TSH, 3rd generation       Cardiovascular and Mediastinum    Sick sinus syndrome (HCC)       Other    Hyperlipidemia    Relevant Medications    atorvastatin (LIPITOR) 20 mg tablet    Other Relevant Orders    CBC and differential    Comprehensive metabolic panel    Lipid panel    Urinalysis with microscopic    Obesity (BMI 30-39  9)    Obesity, morbid ([address] )   Other Visit Diagnoses     LILIA (obstructive sleep apnea)    -  Primary    Vitamin D deficiency        Medicare annual wellness visit, subsequent               Preventive health issues were discussed with patient, and age appropriate screening tests were ordered as noted in patient's After Visit Summary  Personalized health advice and appropriate referrals for health education or preventive services given if needed, as noted in patient's After Visit Summary  History of Present Illness:     Patient presents for a Medicare Wellness Visit    Is a 67-year-old gentleman with a history of sick sinus syndrome and pacemaker obstructive sleep apnea transient global amnesia hyperlipidemia obesity hypothyroidism  Currently he denies any chest pain or shortness of breath  Patient Care Team:  Rebecca Farias MD as PCP - General (Internal Medicine)     Review of Systems:     Review of Systems   Constitutional: Negative for appetite change, chills, fatigue and fever  HENT: Negative for sore throat and trouble swallowing  Eyes: Negative for redness  Respiratory: Negative for shortness of breath  Cardiovascular: Negative for chest pain and palpitations  Gastrointestinal: Negative for abdominal pain, constipation and diarrhea  Genitourinary: Negative for dysuria and hematuria  Musculoskeletal: Negative for back pain and neck pain  Skin: Negative for rash  Neurological: Negative for seizures, weakness and headaches  Hematological: Negative for adenopathy  Psychiatric/Behavioral: Negative for confusion  The patient is not nervous/anxious  Problem List:     Patient Active Problem List   Diagnosis   • Hyperlipidemia   • Hypothyroidism   • Obesity, unspecified   • Pacemaker   • Sick sinus syndrome ([address] )   • Transient global amnesia   • Obesity (BMI 30-39  9)   • Obesity, morbid ([address] )      Past Medical and Surgical History:     Past Medical History:   Diagnosis Date   • Disease of thyroid gland      Past Surgical History:   Procedure Laterality Date   • CARDIAC PACEMAKER PLACEMENT     • TONSILLECTOMY        Family History:     Family History   Problem Relation Age of Onset   • Myasthenia gravis Mother    • COPD Father       Social History:     Social History     Socioeconomic History   • Marital status: /Civil Union     Spouse name: None   • Number of children: None   • Years of education: None   • Highest education level: None   Occupational History   • None   Tobacco Use   • Smoking status: Never   • Smokeless tobacco: Never   • Tobacco comments:     Nonsmoker - As per [employer]    Vaping Use   • Vaping Use: Every day   Substance and Sexual Activity   • Alcohol use: Yes     Alcohol/week: 1 0 standard drink     Types: 1 Shots of liquor per week     Comment: socially    • Drug use: Never   • Sexual activity: None   Other Topics Concern   • None   Social History Narrative   • None     Social Determinants of Health     Financial Resource Strain: Low Risk    • Difficulty of Paying Living Expenses: Not hard at all   Food Insecurity: Not on file   Transportation Needs: No Transportation Needs   • Lack of Transportation (Medical): No   • Lack of Transportation (Non-Medical):  No   Physical Activity: Not on file   Stress: Not on file   Social Connections: Not on file   Intimate Partner Violence: Not on file   Housing Stability: Not on file      Medications and Allergies:     Current Outpatient Medications   Medication Sig Dispense Refill   • aspirin (ECOTRIN LOW STRENGTH) 81 mg EC tablet Take 81 mg by mouth 2 (two) times a day     • atorvastatin (LIPITOR) 20 mg tablet Take 1 tablet (20 mg total) by mouth daily 90 tablet 2   • levothyroxine 125 mcg tablet Take 1 tablet (125 mcg total) by mouth daily 90 tablet 0   • Red Yeast Rice 600 MG CAPS Take 600 mg by mouth 2 (two) times a day     • Saw Palmetto, Serenoa repens, (SAW PALMETTO PO) Take 1 Dose by mouth       No current facility-administered medications for this visit  No Known Allergies   Immunizations:     Immunization History   Administered Date(s) Administered   • COVID-19 MODERNA VACC 0 25 ML IM BOOSTER 12/08/2021   • COVID-19 MODERNA VACC 0 5 ML IM 01/22/2021, 02/17/2021   • Td (adult), Unspecified 08/06/2021   • Tdap 02/25/2014      Health Maintenance:         Topic Date Due   • Hepatitis C Screening  Never done   • Colorectal Cancer Screening  Discontinued         Topic Date Due   • Pneumococcal Vaccine: 65+ Years (1 - PCV) Never done   • COVID-19 Vaccine (4 - Booster for Moderna series) 02/02/2022   • Influenza Vaccine (1) Never done      Medicare Screening Tests and Risk Assessments:     Cherrie Casillas is here for his Subsequent Wellness visit  Last Medicare Wellness visit information reviewed, patient interviewed and updates made to the record today  Health Risk Assessment:   Patient rates overall health as excellent  Patient feels that their physical health rating is same  Patient is very satisfied with their life  Eyesight was rated as same  Hearing was rated as same  Patient feels that their emotional and mental health rating is same  Patients states they are never, rarely angry  Patient states they are never, rarely unusually tired/fatigued  Pain experienced in the last 7 days has been some  Patient's pain rating has been 2/10  Patient states that he has experienced no weight loss or gain in last 6 months       Depression Screening:   PHQ-2 Score: 0      Fall Risk Screening: In the past year, patient has experienced: no history of falling in past year      Home Safety:  Patient does not have trouble with stairs inside or outside of their home  Patient has working smoke alarms and has working carbon monoxide detector  Home safety hazards include: none  Nutrition:   Current diet is Regular  Medications:   Patient is currently taking over-the-counter supplements  OTC medications include: Red yeast rice, saw pometto,  Patient is able to manage medications  Activities of Daily Living (ADLs)/Instrumental Activities of Daily Living (IADLs):   Walk and transfer into and out of bed and chair?: Yes  Dress and groom yourself?: Yes    Bathe or shower yourself?: Yes    Feed yourself? Yes  Do your laundry/housekeeping?: Yes  Manage your money, pay your bills and track your expenses?: Yes  Make your own meals?: Yes    Do your own shopping?: Yes    Durable Medical Equipment Suppliers  [employer]    Previous Hospitalizations:   Any hospitalizations or ED visits within the last 12 months?: Yes    How many hospitalizations have you had in the last year?: 1-2    Hospitalization Comments: Tight knee replacement    Advance Care Planning:   Living will: Yes    Durable POA for healthcare: Yes    Advanced directive: Yes      PREVENTIVE SCREENINGS      Cardiovascular Screening:    General: Screening Not Indicated and History Lipid Disorder      Diabetes Screening:     General: Screening Current      Prostate Cancer Screening:    General: Screening Not Indicated      Lung Cancer Screening:     General: Screening Not Indicated    Screening, Brief Intervention, and Referral to Treatment (SBIRT)    Screening  Typical number of drinks in a day: 1  Typical number of drinks in a week: 7  Interpretation: Low risk drinking behavior      AUDIT-C Screenin) How often did you have a drink containing alcohol in the past year? 4 or more times a week  2) How many drinks did you have on a typical day when you were drinking in the past year? 1 to 2  3) How often did you have 6 or more drinks on one occasion in the past year? never    AUDIT-C Score: 4  Interpretation: Score 4-12 (male): POSITIVE screen for alcohol misuse    AUDIT Screenin) How often during the last year have you found that you were not able to stop drinking once you had started? 0 - never  5) How often during the last year have you failed to do what was normally expected from you because of drinking? 0 - never  6) How often during the last year have you needed a first drink in the morning to get yourself going after a heavy drinking session? 0 - never  7) How often during the last year have you had a feeling of guilt or remorse after drinking? 0 - never  8) How often during the last year have you been unable to remember what happened the night before because you had been drinking? 0 - never  9) Have you or someone else been injured as a result of your drinking? 0 - no  10) Has a relative or friend or a doctor or another health worker been concerned about your drinking or suggested you cut down? 0 - no    AUDIT Score: 4  Interpretation: Low risk alcohol consumption    Single Item Drug Screening:  How often have you used an illegal drug (including marijuana) or a prescription medication for non-medical reasons in the past year? never    Single Item Drug Screen Score: 0  Interpretation: Negative screen for possible drug use disorder    Other Counseling Topics:   Car/seat belt/driving safety, skin self-exam, sunscreen and regular weightbearing exercise and calcium and vitamin D intake  No results found  Physical Exam:     /70 (BP Location: Left arm, Patient Position: Sitting, Cuff Size: Large)   Pulse 68   Temp 98 °F (36 7 °C) (Temporal)   Ht 5' 10" (1 778 m)   Wt 109 kg (240 lb)   SpO2 95%   BMI 34 44 kg/m²     Physical Exam  Vitals and nursing note reviewed  Constitutional:       General: He is not in acute distress  Appearance: He is well-developed  HENT:      Head: Normocephalic and atraumatic  Mouth/Throat:      Mouth: Mucous membranes are moist    Eyes:      Conjunctiva/sclera: Conjunctivae normal    Cardiovascular:      Rate and Rhythm: Normal rate and regular rhythm  Heart sounds: No murmur heard  Pulmonary:      Effort: Pulmonary effort is normal  No respiratory distress  Breath sounds: Normal breath sounds  Abdominal:      Palpations: Abdomen is soft  Tenderness: There is no abdominal tenderness  Musculoskeletal:         General: No swelling  Cervical back: Neck supple  Skin:     General: Skin is warm and dry  Capillary Refill: Capillary refill takes less than 2 seconds  Neurological:      Mental Status: He is alert  Psychiatric:         Mood and Affect: Mood normal       BMI Counseling: Body mass index is 34 44 kg/m²  The BMI is above normal  Nutrition recommendations include reducing portion sizes      Agata Lawson MD

## 2023-01-22 ENCOUNTER — PATIENT MESSAGE (OUTPATIENT)
Dept: INTERNAL MEDICINE CLINIC | Facility: CLINIC | Age: 78
End: 2023-01-22

## 2023-01-22 DIAGNOSIS — E03.9 ACQUIRED HYPOTHYROIDISM: ICD-10-CM

## 2023-01-23 RX ORDER — LEVOTHYROXINE SODIUM 0.12 MG/1
125 TABLET ORAL DAILY
Qty: 90 TABLET | Refills: 0 | Status: SHIPPED | OUTPATIENT
Start: 2023-01-23

## 2023-01-24 ENCOUNTER — EVALUATION (OUTPATIENT)
Dept: PHYSICAL THERAPY | Facility: CLINIC | Age: 78
End: 2023-01-24

## 2023-01-24 ENCOUNTER — APPOINTMENT (OUTPATIENT)
Dept: LAB | Facility: CLINIC | Age: 78
End: 2023-01-24

## 2023-01-24 DIAGNOSIS — E03.9 ACQUIRED HYPOTHYROIDISM: ICD-10-CM

## 2023-01-24 DIAGNOSIS — Z96.651 STATUS POST TOTAL RIGHT KNEE REPLACEMENT: ICD-10-CM

## 2023-01-24 DIAGNOSIS — E78.2 MIXED HYPERLIPIDEMIA: ICD-10-CM

## 2023-01-24 DIAGNOSIS — M25.561 CHRONIC PAIN OF RIGHT KNEE: Primary | ICD-10-CM

## 2023-01-24 DIAGNOSIS — G89.29 CHRONIC PAIN OF RIGHT KNEE: Primary | ICD-10-CM

## 2023-01-24 LAB
ALBUMIN SERPL BCP-MCNC: 4.1 G/DL (ref 3.5–5)
ALP SERPL-CCNC: 72 U/L (ref 46–116)
ALT SERPL W P-5'-P-CCNC: 26 U/L (ref 12–78)
ANION GAP SERPL CALCULATED.3IONS-SCNC: 4 MMOL/L (ref 4–13)
AST SERPL W P-5'-P-CCNC: 14 U/L (ref 5–45)
BACTERIA UR QL AUTO: ABNORMAL /HPF
BASOPHILS # BLD AUTO: 0.07 THOUSANDS/ÂΜL (ref 0–0.1)
BASOPHILS NFR BLD AUTO: 1 % (ref 0–1)
BILIRUB SERPL-MCNC: 0.79 MG/DL (ref 0.2–1)
BILIRUB UR QL STRIP: NEGATIVE
BUN SERPL-MCNC: 19 MG/DL (ref 5–25)
CALCIUM SERPL-MCNC: 9.6 MG/DL (ref 8.3–10.1)
CHLORIDE SERPL-SCNC: 104 MMOL/L (ref 96–108)
CHOLEST SERPL-MCNC: 162 MG/DL
CLARITY UR: CLEAR
CO2 SERPL-SCNC: 29 MMOL/L (ref 21–32)
COLOR UR: ABNORMAL
CREAT SERPL-MCNC: 0.95 MG/DL (ref 0.6–1.3)
EOSINOPHIL # BLD AUTO: 0.28 THOUSAND/ÂΜL (ref 0–0.61)
EOSINOPHIL NFR BLD AUTO: 5 % (ref 0–6)
ERYTHROCYTE [DISTWIDTH] IN BLOOD BY AUTOMATED COUNT: 13.7 % (ref 11.6–15.1)
GFR SERPL CREATININE-BSD FRML MDRD: 76 ML/MIN/1.73SQ M
GLUCOSE P FAST SERPL-MCNC: 94 MG/DL (ref 65–99)
GLUCOSE UR STRIP-MCNC: NEGATIVE MG/DL
HCT VFR BLD AUTO: 46.1 % (ref 36.5–49.3)
HDLC SERPL-MCNC: 49 MG/DL
HGB BLD-MCNC: 14.8 G/DL (ref 12–17)
HGB UR QL STRIP.AUTO: NEGATIVE
IMM GRANULOCYTES # BLD AUTO: 0.03 THOUSAND/UL (ref 0–0.2)
IMM GRANULOCYTES NFR BLD AUTO: 1 % (ref 0–2)
KETONES UR STRIP-MCNC: NEGATIVE MG/DL
LDLC SERPL CALC-MCNC: 99 MG/DL (ref 0–100)
LEUKOCYTE ESTERASE UR QL STRIP: NEGATIVE
LYMPHOCYTES # BLD AUTO: 1.46 THOUSANDS/ÂΜL (ref 0.6–4.47)
LYMPHOCYTES NFR BLD AUTO: 23 % (ref 14–44)
MCH RBC QN AUTO: 31.1 PG (ref 26.8–34.3)
MCHC RBC AUTO-ENTMCNC: 32.1 G/DL (ref 31.4–37.4)
MCV RBC AUTO: 97 FL (ref 82–98)
MONOCYTES # BLD AUTO: 0.5 THOUSAND/ÂΜL (ref 0.17–1.22)
MONOCYTES NFR BLD AUTO: 8 % (ref 4–12)
MUCOUS THREADS UR QL AUTO: ABNORMAL
NEUTROPHILS # BLD AUTO: 3.95 THOUSANDS/ÂΜL (ref 1.85–7.62)
NEUTS SEG NFR BLD AUTO: 62 % (ref 43–75)
NITRITE UR QL STRIP: NEGATIVE
NON-SQ EPI CELLS URNS QL MICRO: ABNORMAL /HPF
NONHDLC SERPL-MCNC: 113 MG/DL
NRBC BLD AUTO-RTO: 0 /100 WBCS
PH UR STRIP.AUTO: 6 [PH]
PLATELET # BLD AUTO: 265 THOUSANDS/UL (ref 149–390)
PMV BLD AUTO: 11 FL (ref 8.9–12.7)
POTASSIUM SERPL-SCNC: 4.3 MMOL/L (ref 3.5–5.3)
PROT SERPL-MCNC: 7.2 G/DL (ref 6.4–8.4)
PROT UR STRIP-MCNC: NEGATIVE MG/DL
RBC # BLD AUTO: 4.76 MILLION/UL (ref 3.88–5.62)
RBC #/AREA URNS AUTO: ABNORMAL /HPF
SODIUM SERPL-SCNC: 137 MMOL/L (ref 135–147)
SP GR UR STRIP.AUTO: 1.01 (ref 1–1.03)
T4 FREE SERPL-MCNC: 0.92 NG/DL (ref 0.76–1.46)
TRIGL SERPL-MCNC: 72 MG/DL
TSH SERPL DL<=0.05 MIU/L-ACNC: 2.26 UIU/ML (ref 0.45–4.5)
UROBILINOGEN UR STRIP-ACNC: <2 MG/DL
WBC # BLD AUTO: 6.29 THOUSAND/UL (ref 4.31–10.16)
WBC #/AREA URNS AUTO: ABNORMAL /HPF

## 2023-01-24 NOTE — PROGRESS NOTES
PT Re-Evaluation     Today's date: 2023  Patient name: Bang Corona  : 1945   MRN: [id number]  Referring provider: Ryder Fournier MD  Dx:   Encounter Diagnosis     ICD-10-CM    1  Chronic pain of right knee  M25 561     G89 29       2  Status post total right knee replacement  [id number] [id number]                      Assessment  Assessment details: Supriya Ramos is a pleasant 70 yo male presenting to OP PT for post op Physical therapy evaluation, with TKA performed on 2022  Patient has made large functional improvements since beginning OPPT  Patient has achieved full ROM on the affected knee, improved swelling, and LE strength which has led to improved function  Patient no longer ambulates with an assisted device  Patient Ilene Marino has improved from 52 to 61 in 10 visits since IE, indicating this improvement in function  Patient does however continue to lack quadriceps strength as compared to the LLE  During eccentric stair negotiation last week, patient experienced sharp pain in the medial knee which made all activities difficult to complete  Upon assessment this date, patient presents with impaired quadriceps strength with increased pain and TTP along the muscle belly of the vastus medialis  No TTP within the knee itself present this date  Due to this, patient has difficulty performing sit to stands, negotiating stairs, and performing some of his daily tasks (bending/stooping)  Because of these difficulties patient would continue to benefit from OPPT to improve his function and return to PLOF  Patient would benefit from skilled physical therapy to address the impairments, improve their level of function, and to improve their overall quality of life  PT POC 1-2x/wk for 4 weeks   Thank you for this referral     Impairments: abnormal gait, abnormal or restricted ROM, activity intolerance, impaired physical strength, lacks appropriate home exercise program, pain with function and weight-bearing intolerance    Symptom irritability: lowUnderstanding of Dx/Px/POC: good   Prognosis: good    Goals  1  Decrease pain by 50% in 4 weeks to improve tolerance to prolonged amb, standing, and reciprocal stair climbing - met   2  Increase left lower extremity strength golbally to 4/5 in 6 weeks  - progressing  3  Increase left hip abductor and external rotator strength strength to 4+/5  6 weeks  - met   4  Pt will demonstrate 10- 20 deg improvement in Left knee flex/ext  to increase tolerance to getting into/out of her chair, and putting on shoes/socks- met     1  Return to Prior Level of Function in 8 weeks  2  Pt will demonstrate [place] with progressive home exercise program to improve carryover and decrease recurrence of symptoms  - progressing  3  Pt will demonstrate 20% improvement in FOTO score to increase tolerance to functional activities - met  4  Pt will demonstrate 5/5 in LE strength to allow for improved tolerance to prolonged amb/standing in 6-8 weeks- progressing  5  Patient FOTO score will be greater than or equal to 69 by discharge  - progressing      Plan  Patient would benefit from: skilled physical therapy  Planned modality interventions: TENS, ultrasound, thermotherapy: hydrocollator packs, cryotherapy, manual electrical stimulation, low level laser therapy, unattended electrical stimulation, traction and electrical stimulation/Russian stimulation  Planned therapy interventions: therapeutic exercise, therapeutic activities, stretching, strengthening, patient education, balance, manual therapy, neuromuscular re-education, home exercise program, joint mobilization, massage, Simpson taping and gait training  Frequency: 2x week  Plan of Care beginning date: 12/8/2022  Plan of Care expiration date: 2/16/2023  Treatment plan discussed with: patient        Subjective Evaluation    History of Present Illness  Mechanism of injury: May Phipps is a 70 yo male presenting to OP PT for his 1st post op appointment with surgery being 22  Patient notes a good transition with minimal pain or discomfort  Patient reports taking the pain medication as needed but has been regularly taking baby Asprin and a "muscle relaxer" at night  Patient reports he has been active in the home and has been using a recumbent bike at home regularly which he used "4" times yesterday  He has noted that with longer distance walking, he experiences buckling of the knee  He finds minimal pain with no radiation of sxs into the lower leg  Does note some swelling on the thigh but no pain is associated with this  He has been compliant with previously provided HEP  Recurrent probem    Quality of life: good    Pain  Current pain ratin  At best pain ratin  At worst pain ratin  Location: Right knee  Quality: tight (hot)  Relieving factors: rest  Aggravating factors: standing, stair climbing and walking    Social Support  Steps to enter house: yes  Lives in: trailer  Lives with: spouse    Hand dominance: right  Exercise history: intermittent amb/hiking       Diagnostic Tests  X-ray: abnormal  Treatments  Previous treatment: injection treatment  Patient Goals  Patient goals for therapy: increased strength, decreased pain, improved balance and return to sport/leisure activities      Parminder Pedraza reports a perceived improvement since starting OPPT  Functional status measure is now 60  Patient notes improvement in quad discomfort since last Thursday but notes that he still has difficulty with a SLR and hip flexion with tenderness on the medial R knee  He also notes difficulty with sit to stand activities Notes that he has been regularly icing and has been able to perform his HEP other then those 2 exercises  Patient also notes that he would like to improve his ability to stand from the ground  Overall, patient has made steady progress toward goals and would benefit from additional PT at this time         Objective     Palpation     Right   No palpable tenderness to the distal biceps femoris, distal semimembranosus, distal semitendinosus, lateral gastrocnemius, medial gastrocnemius and vastus lateralis  Tenderness of the vastus medialis  Tenderness     Right Knee   Tenderness in the quadriceps tendon  No tenderness in the superior patella       Active Range of Motion   Left Knee   Normal active range of motion  Flexion: 135 degrees   Extension: 0 degrees     Right Knee   Flexion: 135 degrees   Extension: 0 degrees     Mobility   Patellar Mobility:     Right Knee   WFL: medial, lateral, superior and inferior    Strength/Myotome Testing     Left Knee   Flexion: 5  Prone flexion: 5  Extension: 5  Quadriceps contraction: good    Right Knee   Flexion: 5  Extension: 3+  Quadriceps contraction: good    Left Ankle/Foot   Dorsiflexion: 5  Plantar flexion: 5    Right Ankle/Foot   Dorsiflexion: 5  Plantar flexion: 5    Ambulation     Ambulation: Stairs   Ascend stairs: independent  Pattern: reciprocal  Railings: without rails  Descend stairs: independent  Pattern: reciprocal  Railings: without rails    Observational Gait     Additional Observational Gait Details  Ambulates with use of RW; impaired TKE as patient reports some instances of buckling- resolved (1/24)     Functional Assessment        Comments  5 sit to stands: 11 seconds no UE      TUG: 10 seconds no RW    General Comments:      Knee Comments  Incision dry, intact with no increases in redness or discharge noted    Capillary refill WNL                  Precautions: TKA RLE to be completed 12/19/22        Manuals 1/19 1/24 IE 12/22 12/27 12/29 1/3 1/5 1/10 1/12 1/17   Knee prom EB   EB EB EB EB EB EB EB   Patellar PROM EB   EB EB        Adhesive removal         EB    Re-eval   EB           Quad/knee STM EB EB           Neuro Re-Ed             Quad sets  20x3 " eccentric/concentric           Prone quad sets   20x3"            TKE   2x30 15#            SLS  3x30" R UE support     4x30" R UE support       Foam stance Standing hip abduction     2x10   2x10 gtb      Standing hip flexion     2x10    2x10 gtb     Standing hip extension      2x10    2x10 gtb      Knee extension machine           2 up 1 down 22# x15 ea    LAQ      2x15 2# focus on eccentric control  30x 4#  30x 4# 30x 5#    Hamstring curl         2x15 55# 2x15 55#  2 up 1 down 55#  x15 ea    Ther Ex             Bike 8' upright  8' upright   For ROM 6' rocking 6' rocking able to get full ROM by end  6' full  8' full  8' full  8;' full  8' full   Prone quad stretch 10x10"         15x5"   Standing SLR             Gastroc st             Hs st             Mini squats    x10 from low table 3x6 from chair  2x10 from chair   2x10 from chair  2x10 from chair      SAQ                          Hamstring curl             Knee extension             Leg press          3x10 110#DL  x15 ea 90#    SLR    2x10 w/quad set  2x10 w/quad set 2x15 w/quad set 2#  2x10 3#  2x10 4#  2x 10 4#  2x10 5#    Bridges     2x10         S/L abduction   *add to HEP    2x10 3#       Sit to stands           2x10    Clamshells   *add to HEP          Ther Activity             Step ups fwd/lat      2x10 4" no UE support 20x fwd/lat 6"    6" reciprocal 4'    Monster walks  5 laps gtb        5 laps gtb     Side steps  5 laps gtb      5 laps rtb  5 laps gtb      Resisted fwd walking        x10 20#   x10 27 5#    Resisted side steps          x5 ea direction 15#     Retro walking      x10 20# x10 25#  x10 27 5#    Gait Training             SPC    EB                      Modalities             CP prn 5'

## 2023-01-24 NOTE — LETTER
[id number]    MD Makenna Graham [id number] [id number]    Patient: Dustin Hansen   YOB: 1945   Date of Visit: 2023     Encounter Diagnosis     ICD-10-CM    1  Chronic pain of right knee  M25 561     G89 29       2  Status post total right knee replacement  Z96 [id number]           Dear Dr Toya Conway: Thank you for your recent referral of Dustin Hansen  Please review the attached evaluation summary from Lauri's recent visit  Please verify that you agree with the plan of care by signing the attached order  If you have any questions or concerns, please do not hesitate to call  I sincerely appreciate the opportunity to share in the care of one of your patients and hope to have another opportunity to work with you in the near future  Sincerely,    Kam Fish, PT      Referring Provider:      I certify that I have read the below Plan of Care and certify the need for these services furnished under this plan of treatment while under my care  MD Makenna Graham [phone] [id number]  Via Fax: [phone]          PT Re-Evaluation     Today's date: 2023  Patient name: Dustin Hansen  : 1945   MRN: [id number]  Referring provider: Tai Beaver MD  Dx:   Encounter Diagnosis     ICD-10-CM    1  Chronic pain of right knee  M25 561     G89 29       2  Status post total right knee replacement  [id number] [id number]                      Assessment  Assessment details: Marty Mir is a pleasant 70 yo male presenting to OP PT for post op Physical therapy evaluation, with TKA performed on 2022  Patient has made large functional improvements since beginning OPPT  Patient has achieved full ROM on the affected knee, improved swelling, and LE strength which has led to improved function  Patient no longer ambulates with an assisted device   Patient Rachel Gray has improved from 52 to 60 in 10 visits since IE, indicating this improvement in function  Patient does however continue to lack quadriceps strength as compared to the LLE  During eccentric stair negotiation last week, patient experienced sharp pain in the medial knee which made all activities difficult to complete  Upon assessment this date, patient presents with impaired quadriceps strength with increased pain and TTP along the muscle belly of the vastus medialis  No TTP within the knee itself present this date  Due to this, patient has difficulty performing sit to stands, negotiating stairs, and performing some of his daily tasks (bending/stooping)  Because of these difficulties patient would continue to benefit from OPPT to improve his function and return to PLOF  Patient would benefit from skilled physical therapy to address the impairments, improve their level of function, and to improve their overall quality of life  PT POC 1-2x/wk for 4 weeks  Thank you for this referral     Impairments: abnormal gait, abnormal or restricted ROM, activity intolerance, impaired physical strength, lacks appropriate home exercise program, pain with function and weight-bearing intolerance    Symptom irritability: lowUnderstanding of Dx/Px/POC: good   Prognosis: good    Goals  1  Decrease pain by 50% in 4 weeks to improve tolerance to prolonged amb, standing, and reciprocal stair climbing - met   2  Increase left lower extremity strength golbally to 4/5 in 6 weeks  - progressing  3  Increase left hip abductor and external rotator strength strength to 4+/5  6 weeks  - met   4  Pt will demonstrate 10- 20 deg improvement in Left knee flex/ext  to increase tolerance to getting into/out of her chair, and putting on shoes/socks- met     1  Return to Prior Level of Function in 8 weeks  2  Pt will demonstrate [place] with progressive home exercise program to improve carryover and decrease recurrence of symptoms  - progressing  3   Pt will demonstrate 20% improvement in FOTO score to increase tolerance to functional activities - met  4  Pt will demonstrate 5/5 in LE strength to allow for improved tolerance to prolonged amb/standing in 6-8 weeks- progressing  5  Patient FOTO score will be greater than or equal to 69 by discharge  - progressing      Plan  Patient would benefit from: skilled physical therapy  Planned modality interventions: TENS, ultrasound, thermotherapy: hydrocollator packs, cryotherapy, manual electrical stimulation, low level laser therapy, unattended electrical stimulation, traction and electrical stimulation/Russian stimulation  Planned therapy interventions: therapeutic exercise, therapeutic activities, stretching, strengthening, patient education, balance, manual therapy, neuromuscular re-education, home exercise program, joint mobilization, massage, Simpson taping and gait training  Frequency: 2x week  Plan of Care beginning date: 2022  Plan of Care expiration date: 2023  Treatment plan discussed with: patient        Subjective Evaluation    History of Present Illness  Mechanism of injury: Citlali Tee is a 68 yo male presenting to OP PT for his 1st post op appointment with surgery being 22  Patient notes a good transition with minimal pain or discomfort  Patient reports taking the pain medication as needed but has been regularly taking baby Asprin and a "muscle relaxer" at night  Patient reports he has been active in the home and has been using a recumbent bike at home regularly which he used "4" times yesterday  He has noted that with longer distance walking, he experiences buckling of the knee  He finds minimal pain with no radiation of sxs into the lower leg  Does note some swelling on the thigh but no pain is associated with this  He has been compliant with previously provided HEP              Recurrent probem    Quality of life: good    Pain  Current pain ratin  At best pain ratin  At worst pain ratin  Location: Right knee  Quality: tight (hot)  Relieving factors: rest  Aggravating factors: standing, stair climbing and walking    Social Support  Steps to enter house: yes  Lives in: trailer  Lives with: spouse    Hand dominance: right  Exercise history: intermittent amb/hiking       Diagnostic Tests  X-ray: abnormal  Treatments  Previous treatment: injection treatment  Patient Goals  Patient goals for therapy: increased strength, decreased pain, improved balance and return to sport/leisure activities      Marlyn Brice reports a perceived improvement since starting OPPT  Functional status measure is now 60  Patient notes improvement in quad discomfort since last Thursday but notes that he still has difficulty with a SLR and hip flexion with tenderness on the medial R knee  He also notes difficulty with sit to stand activities Notes that he has been regularly icing and has been able to perform his HEP other then those 2 exercises  Patient also notes that he would like to improve his ability to stand from the ground  Overall, patient has made steady progress toward goals and would benefit from additional PT at this time  Objective     Palpation     Right   No palpable tenderness to the distal biceps femoris, distal semimembranosus, distal semitendinosus, lateral gastrocnemius, medial gastrocnemius and vastus lateralis  Tenderness of the vastus medialis  Tenderness     Right Knee   Tenderness in the quadriceps tendon  No tenderness in the superior patella       Active Range of Motion   Left Knee   Normal active range of motion  Flexion: 135 degrees   Extension: 0 degrees     Right Knee   Flexion: 135 degrees   Extension: 0 degrees     Mobility   Patellar Mobility:     Right Knee   WFL: medial, lateral, superior and inferior    Strength/Myotome Testing     Left Knee   Flexion: 5  Prone flexion: 5  Extension: 5  Quadriceps contraction: good    Right Knee   Flexion: 5  Extension: 3+  Quadriceps contraction: good    Left Ankle/Foot   Dorsiflexion: 5  Plantar flexion: 5    Right Ankle/Foot   Dorsiflexion: 5  Plantar flexion: 5    Ambulation     Ambulation: Stairs   Ascend stairs: independent  Pattern: reciprocal  Railings: without rails  Descend stairs: independent  Pattern: reciprocal  Railings: without rails    Observational Gait     Additional Observational Gait Details  Ambulates with use of RW; impaired TKE as patient reports some instances of buckling- resolved (1/24)     Functional Assessment        Comments  5 sit to stands: 11 seconds no UE      TUG: 10 seconds no RW    General Comments:      Knee Comments  Incision dry, intact with no increases in redness or discharge noted    Capillary refill WNL                 Precautions: TKA RLE to be completed 12/19/22        Manuals 1/19 1/24 IE 12/22 12/27 12/29 1/3 1/5 1/10 1/12 1/17   Knee prom EB   EB EB EB EB EB EB EB   Patellar PROM EB   EB EB        Adhesive removal         EB    Re-eval   EB           Quad/knee STM EB EB           Neuro Re-Ed             Quad sets  20x3 " eccentric/concentric           Prone quad sets   20x3"            TKE   2x30 15#            SLS  3x30" R UE support     4x30" R UE support       Foam stance             Standing hip abduction     2x10   2x10 gtb      Standing hip flexion     2x10    2x10 gtb     Standing hip extension      2x10    2x10 gtb      Knee extension machine           2 up 1 down 22# x15 ea    LAQ      2x15 2# focus on eccentric control  30x 4#  30x 4# 30x 5#    Hamstring curl         2x15 55# 2x15 55#  2 up 1 down 55#  x15 ea    Ther Ex             Bike 8' upright  8' upright   For ROM 6' rocking 6' rocking able to get full ROM by end  6' full  8' full  8' full  8;' full  8' full   Prone quad stretch 10x10"         15x5"   Standing SLR             Gastroc st             Hs st             Mini squats    x10 from low table 3x6 from chair  2x10 from chair   2x10 from chair  2x10 from chair      SAQ                          Hamstring curl             Knee extension             Leg press 3x10 110#DL  x15 ea 90#    SLR    2x10 w/quad set  2x10 w/quad set 2x15 w/quad set 2#  2x10 3#  2x10 4#  2x 10 4#  2x10 5#    Bridges     2x10         S/L abduction   *add to HEP    2x10 3#       Sit to stands           2x10    Clamshells   *add to HEP          Ther Activity             Step ups fwd/lat      2x10 4" no UE support 20x fwd/lat 6"    6" reciprocal 4'    Monster walks  5 laps gtb        5 laps gtb     Side steps  5 laps gtb      5 laps rtb  5 laps gtb      Resisted fwd walking        x10 20#   x10 27 5#    Resisted side steps          x5 ea direction 15#     Retro walking      x10 20# x10 25#  x10 27 5#    Gait Training             SPC    EB                      Modalities             CP prn 5'

## 2023-01-26 ENCOUNTER — OFFICE VISIT (OUTPATIENT)
Dept: PHYSICAL THERAPY | Facility: CLINIC | Age: 78
End: 2023-01-26

## 2023-01-26 DIAGNOSIS — M25.561 CHRONIC PAIN OF RIGHT KNEE: Primary | ICD-10-CM

## 2023-01-26 DIAGNOSIS — Z96.651 STATUS POST TOTAL RIGHT KNEE REPLACEMENT: ICD-10-CM

## 2023-01-26 DIAGNOSIS — G89.29 CHRONIC PAIN OF RIGHT KNEE: Primary | ICD-10-CM

## 2023-01-26 NOTE — PROGRESS NOTES
Daily Note     Today's date: 2023  Patient name: Batsheva Pizano  : 1945  MRN: [id number]  Referring provider: Michoacano Verma MD  Dx:   Encounter Diagnosis     ICD-10-CM    1  Chronic pain of right knee  M25 561     G89 29       2  Status post total right knee replacement  [id number] [id number]           Start Time: 0845  Stop Time: 930  Total time in clinic (min): 45 minutes    Subjective: Patient reports improvement in anterior knee discomfort since Tuesday  Has been consistently icing his knee as needed  Objective: See treatment diary below      Assessment: Tolerated treatment well  Able to add BW activities of the quadriceps muscle back in this date  Good exercise technique exhibited throughout session  Able to perform step down activity from 6" step without increases in pain noted  Fatigue noted by end of session  Progress as able  Patient would benefit from continued PT      Plan: Continue per plan of care        Precautions: TKA RLE to be completed 22        Manuals 1/19 1/24 1/26 12/27 12/29 1/3 1/5 1/10 1/12 1/17   Knee prom EB   EB EB EB EB EB EB EB   Patellar PROM EB   EB EB        Adhesive removal         EB    Re-eval   EB           Quad/knee STM EB EB EB          Neuro Re-Ed             Quad sets  20x3 " eccentric/concentric 20x3 " eccentric/concentric          Prone quad sets   20x3"  20x3" 4#           TKE   2x30 15#            SLS  3x30" R UE support  3x30" R UE support on biodex foam    4x30" R UE support       Foam stance             Standing hip abduction     2x10   2x10 gtb      Standing hip flexion     2x10    2x10 gtb     Standing hip extension      2x10    2x10 gtb      Knee extension machine           2 up 1 down 22# x15 ea    LAQ   3x10    2x15 2# focus on eccentric control  30x 4#  30x 4# 30x 5#    Hamstring curl         2x15 55# 2x15 55#  2 up 1 down 55#  x15 ea    Ther Ex             Bike 8' upright  8' upright  8' upright For ROM 6' rocking 6' rocking able to get full ROM by end 6' full  8' full  8' full  8;' full  8' full   Prone quad stretch 10x10"         15x5"   Standing SLR             Gastroc st             Hs st             Mini squats    x10 from low table 3x6 from chair  2x10 from chair   2x10 from chair  2x10 from chair      SAQ             Mini squats    2x10           Hamstring curl             Knee extension             Leg press          3x10 110#DL  x15 ea 90#    SLR    2x10 w/quad set  2x10 w/quad set 2x15 w/quad set 2#  2x10 3#  2x10 4#  2x 10 4#  2x10 5#    Bridges     2x10         S/L abduction       2x10 3#       Sit to stands           2x10    Clamshells             Ther Activity             Step ups fwd/lat   2x10 fwd/lateral 4", 6"    2x10 4" no UE support 20x fwd/lat 6"    6" reciprocal 4'    Monster walks  5 laps gtb        5 laps gtb     Side steps  5 laps gtb      5 laps rtb  5 laps gtb      Resisted fwd walking    x10 22  5#     x10 20#   x10 27 5#    Resisted side steps          x5 ea direction 15#     Retro walking   x10 22  5#   x10 20# x10 25#  x10 27 5#    Gait Training             SPC    EB                      Modalities             CP prn 5'

## 2023-01-31 ENCOUNTER — OFFICE VISIT (OUTPATIENT)
Dept: PHYSICAL THERAPY | Facility: CLINIC | Age: 78
End: 2023-01-31

## 2023-01-31 DIAGNOSIS — M25.561 CHRONIC PAIN OF RIGHT KNEE: Primary | ICD-10-CM

## 2023-01-31 DIAGNOSIS — Z96.651 STATUS POST TOTAL RIGHT KNEE REPLACEMENT: ICD-10-CM

## 2023-01-31 DIAGNOSIS — G89.29 CHRONIC PAIN OF RIGHT KNEE: Primary | ICD-10-CM

## 2023-01-31 NOTE — PROGRESS NOTES
Daily Note     Today's date: 2023  Patient name: Jayro Scott  : 1945  MRN: [id number]  Referring provider: Lionel Zhu MD  Dx:   Encounter Diagnosis     ICD-10-CM    1  Chronic pain of right knee  M25 561     G89 29       2  Status post total right knee replacement  Z96 651           Start Time: 1055  Stop Time: 1140  Total time in clinic (min): 45 minutes    Subjective: Patient reports improved quad discomfort since last visit  Minimal to no other complaints at this time  Objective: See treatment diary below      Assessment: Tolerated treatment well  No major TTP noted any longer in quadriceps tendon this date  Initiated quad exercises again this date with mild soreness reported at end of session  Good control noted during 6" stair negotiation  Encouraged patient to re-initiate gentle quad strengthening at home  Progress as able  May work into getting up from the floor in the future  Patient would benefit from continued PT      Plan: Continue per plan of care        Precautions: TKA RLE to be completed 22        Manuals 1/19 1/24 1/26 1/31  1/3 1/5 1/10 1/12 1/17   Knee prom EB     EB EB EB EB EB   Patellar PROM EB            Adhesive removal         EB    Re-eval   EB           Quad/knee STM EB EB EB EB         Neuro Re-Ed             Quad sets  20x3 " eccentric/concentric 20x3 " eccentric/concentric          Prone quad sets   20x3"  20x3" 4#           TKE   2x30 15#            SLS  3x30" R UE support  3x30" R UE support on biodex foam    4x30" R UE support       Foam stance             Standing hip abduction    2x10 gtb    2x10 gtb      Standing hip flexion    2x10    2x10 gtb     Standing hip extension     2x10 gtb     2x10 gtb      Knee extension machine           2 up 1 down 22# x15 ea    LAQ   3x10  40x 5#   2x15 2# focus on eccentric control  30x 4#  30x 4# 30x 5#    Hamstring curl         2x15 55# 2x15 55#  2 up 1 down 55#  x15 ea    Ther Ex             Bike 8' upright  8' upright 8' upright 8' upright   6' full  8' full  8' full  8;' full  8' full   Prone quad stretch 10x10"         15x5"   Standing SLR             Gastroc st             Hs st             Mini squats      2x10 from chair   2x10 from chair  2x10 from chair      SAQ             Mini squats    2x10           Hamstring curl             Knee extension             Leg press     3x10 95#      3x10 110#DL  x15 ea 90#    SLR      2x15 w/quad set 2#  2x10 3#  2x10 4#  2x 10 4#  2x10 5#    Bridges             S/L abduction       2x10 3#       Sit to stands     2x10 gtb around knees  From low table       2x10    Clamshells             Ther Activity             Step ups fwd/lat   2x10 fwd/lateral 4", 6"  2x10 up and over 4" and 6"   2x10 4" no UE support 20x fwd/lat 6"    6" reciprocal 4'    Monster walks  5 laps gtb    5 laps rtb    5 laps gtb     Side steps  5 laps gtb   5 laps rtb    5 laps rtb  5 laps gtb      Resisted fwd walking    x10 22  5#  x10 25#    x10 20#   x10 27 5#    Resisted side steps          x5 ea direction 15#     Retro walking   x10 22  5# x10 25#   x10 20# x10 25#  x10 27 5#    Gait Training             SPC                          Modalities             CP prn 5'

## 2023-02-02 ENCOUNTER — APPOINTMENT (OUTPATIENT)
Dept: PHYSICAL THERAPY | Facility: CLINIC | Age: 78
End: 2023-02-02

## 2023-02-07 ENCOUNTER — OFFICE VISIT (OUTPATIENT)
Dept: PHYSICAL THERAPY | Facility: CLINIC | Age: 78
End: 2023-02-07

## 2023-02-07 DIAGNOSIS — Z96.651 STATUS POST TOTAL RIGHT KNEE REPLACEMENT: ICD-10-CM

## 2023-02-07 DIAGNOSIS — M25.561 CHRONIC PAIN OF RIGHT KNEE: Primary | ICD-10-CM

## 2023-02-07 DIAGNOSIS — G89.29 CHRONIC PAIN OF RIGHT KNEE: Primary | ICD-10-CM

## 2023-02-07 NOTE — PROGRESS NOTES
Daily Note     Today's date: 2023  Patient name: Daryl Choi  : 1945  MRN: [id number]  Referring provider: Nicholas Kohli MD  Dx:   Encounter Diagnosis     ICD-10-CM    1  Chronic pain of right knee  M25 561     G89 29       2  Status post total right knee replacement  Z96 651           Start Time: 1056  Stop Time: 1140  Total time in clinic (min): 44 minutes    Subjective: Patient notes that his quad discomfort has improved substantially  Notes little to no issue with day to day activity  Objective: See treatment diary below      Assessment: Tolerated treatment well  Patient able to perform SLR and sit to stands without increased discomfort  Began to initiate functional lunge activity to improve tolerance to getting onto the floor  Some discomfort noted but able to alleviate with rest  Fatigue noted by end of session  Progress as able with continued focus on improving functional tolerance to exercise  Patient would benefit from continued PT      Plan: Continue per plan of care        Precautions: TKA RLE to be completed 22        Manuals 1/19 1/24 1/26 1/31 2/7 1/3 1/5 1/10 1/12 1/17   Knee prom EB     EB EB EB EB EB   Patellar PROM EB            Adhesive removal         EB    Re-eval   EB           Quad/knee STM EB EB EB EB         Neuro Re-Ed             Quad sets  20x3 " eccentric/concentric 20x3 " eccentric/concentric          Prone quad sets   20x3"  20x3" 4#           TKE   2x30 15#            SLS  3x30" R UE support  3x30" R UE support on biodex foam   3x30" R UE support biodex foam  4x30" R UE support       Foam stance             Standing hip abduction    2x10 gtb    2x10 gtb      Standing hip flexion    2x10    2x10 gtb     Standing hip extension     2x10 gtb     2x10 gtb      Knee extension machine           2 up 1 down 22# x15 ea    LAQ   3x10  40x 5#   2x15 2# focus on eccentric control  30x 4#  30x 4# 30x 5#    Hamstring curl         2x15 55# 2x15 55#  2 up 1 down 55#  x15 ea Ther Ex             Bike 8' upright  8' upright  8' upright 8' upright  10' upright  6' full  8' full  8' full  8;' full  8' full   Prone quad stretch 10x10"         15x5"   Standing SLR             Gastroc st             Hs st             Mini squats      2x10 from chair   2x10 from chair  2x10 from chair      SAQ             Mini squats    2x10           Hamstring curl             Knee extension             Leg press     3x10 95#  2x10 110# x15 SL 70#     3x10 110#DL  x15 ea 90#    SLR     2x10  2x15 w/quad set 2#  2x10 3#  2x10 4#  2x 10 4#  2x10 5#    Bridges             S/L abduction       2x10 3#       Sit to stands     2x10 gtb around knees  From low table       2x10    Clamshells             Ther Activity             Step ups fwd/lat   2x10 fwd/lateral 4", 6"  2x10 up and over 4" and 6"  Lateral 8" x10 ea side  2x10 4" no UE support 20x fwd/lat 6"    6" reciprocal 4'    Lunges into tap At box      30x ea leg 12" UE support        Monster walks  5 laps gtb    5 laps rtb    5 laps gtb     Side steps  5 laps gtb   5 laps rtb    5 laps rtb  5 laps gtb      Resisted fwd walking    x10 22  5#  x10 25#  x10 30#  x10 20#   x10 27 5#    Resisted side steps      x5 ea side 17 5#     x5 ea direction 15#     Retro walking   x10 22  5# x10 25#  x10 30#  x10 20# x10 25#  x10 27 5#    Gait Training             SPC                          Modalities             CP prn 5'

## 2023-02-09 ENCOUNTER — OFFICE VISIT (OUTPATIENT)
Dept: PHYSICAL THERAPY | Facility: CLINIC | Age: 78
End: 2023-02-09

## 2023-02-09 DIAGNOSIS — M25.561 CHRONIC PAIN OF RIGHT KNEE: Primary | ICD-10-CM

## 2023-02-09 DIAGNOSIS — Z96.651 STATUS POST TOTAL RIGHT KNEE REPLACEMENT: ICD-10-CM

## 2023-02-09 DIAGNOSIS — G89.29 CHRONIC PAIN OF RIGHT KNEE: Primary | ICD-10-CM

## 2023-02-09 NOTE — PROGRESS NOTES
Daily Note     Today's date: 2023  Patient name: Latonia Obrien  : 1945  MRN: [id number]  Referring provider: Nikia Dewey MD  Dx:   Encounter Diagnosis     ICD-10-CM    1  Chronic pain of right knee  M25 561     G89 29       2  Status post total right knee replacement  Z96 651           Start Time: 1053  Stop Time: 1135  Total time in clinic (min): 42 minutes    Subjective: Patient reports trying the new exercises at home yesterday with mild increases in pain noted  Objective: See treatment diary below      Assessment: Tolerated treatment well  Progressed functional activity and strengthening this date to good tolerance  Fatigue noted throughout session with appropriate rest breaks given throughout  Improved lunging ability this date with less pain noted  Continue to progress lunge activity to improve comfortability kneeling on the R knee  Progress as able  Patient would benefit from continued PT      Plan: Continue per plan of care        Precautions: TKA RLE to be completed 22        Manuals 1/19 1/24 1/26 1/31 2/7 2/9 1/5 1/10 1/12 1/17   Knee prom EB      EB EB EB EB   Patellar PROM EB            Adhesive removal         EB    Re-eval   EB           Quad/knee STM EB EB EB EB         Neuro Re-Ed             Quad sets  20x3 " eccentric/concentric 20x3 " eccentric/concentric          Prone quad sets   20x3"  20x3" 4#           TKE   2x30 15#            SLS  3x30" R UE support  3x30" R UE support on biodex foam   3x30" R UE support biodex foam  4x30" R UE support        Foam stance             Standing hip abduction    2x10 gtb    2x10 gtb      Standing hip flexion    2x10    2x10 gtb     Standing hip extension     2x10 gtb     2x10 gtb      Knee extension machine           2 up 1 down 22# x15 ea   TRX Squat      2x10         LAQ   3x10  40x 5#   40x 10#  30x 4#  30x 4# 30x 5#    Hamstring curl         2x15 55# 2x15 55#  2 up 1 down 55#  x15 ea    Ther Ex             Bike 8' upright  8' upright 8' upright 8' upright  10' upright  10' upright  8' full  8' full  8;' full  8' full   Prone quad stretch 10x10"         15x5"   Standing SLR             Gastroc st             Hs st             Mini squats       2x10 from chair  2x10 from chair      SAQ             Mini squats    2x10           Hamstring curl             Knee extension             Leg press     3x10 95#  2x10 110# x15 SL 70#  2x12 ea 80#    3x10 110#DL  x15 ea 90#    SLR     2x10  2x10  2x10 3#  2x10 4#  2x 10 4#  2x10 5#    Bridges             S/L abduction       2x10 3#       Sit to stands     2x10 gtb around knees  From low table       2x10    Clamshells             Ther Activity             Step ups fwd/lat   2x10 fwd/lateral 4", 6"  2x10 up and over 4" and 6"  Lateral 8" x10 ea side   20x fwd/lat 6"    6" reciprocal 4'    Lunges into tap At box      30x ea leg 12" UE support 2x10 2x UE support       Monster walks  5 laps gtb    5 laps rtb    5 laps gtb     Side steps  5 laps gtb   5 laps rtb    5 laps rtb  5 laps gtb      Resisted fwd walking    x10 22  5#  x10 25#  x10 30#  x10 20#   x10 27 5#    Resisted side steps      x5 ea side 17 5#  x5 ea side 17 5#    x5 ea direction 15#     Retro walking   x10 22  5# x10 25#  x10 30#   x10 25#  x10 27 5#    Gait Training             SPC                          Modalities             CP prn 5'

## 2023-02-14 ENCOUNTER — OFFICE VISIT (OUTPATIENT)
Dept: PHYSICAL THERAPY | Facility: CLINIC | Age: 78
End: 2023-02-14

## 2023-02-14 DIAGNOSIS — G89.29 CHRONIC PAIN OF RIGHT KNEE: Primary | ICD-10-CM

## 2023-02-14 DIAGNOSIS — M25.561 CHRONIC PAIN OF RIGHT KNEE: Primary | ICD-10-CM

## 2023-02-14 DIAGNOSIS — Z96.651 STATUS POST TOTAL RIGHT KNEE REPLACEMENT: ICD-10-CM

## 2023-02-15 DIAGNOSIS — E03.9 ACQUIRED HYPOTHYROIDISM: ICD-10-CM

## 2023-02-15 DIAGNOSIS — E78.2 MIXED HYPERLIPIDEMIA: ICD-10-CM

## 2023-02-15 RX ORDER — LEVOTHYROXINE SODIUM 0.12 MG/1
125 TABLET ORAL DAILY
Qty: 90 TABLET | Refills: 1 | Status: SHIPPED | OUTPATIENT
Start: 2023-02-15 | End: 2023-02-15 | Stop reason: CLARIF

## 2023-02-16 ENCOUNTER — OFFICE VISIT (OUTPATIENT)
Dept: PHYSICAL THERAPY | Facility: CLINIC | Age: 78
End: 2023-02-16

## 2023-02-16 DIAGNOSIS — Z96.651 STATUS POST TOTAL RIGHT KNEE REPLACEMENT: ICD-10-CM

## 2023-02-16 DIAGNOSIS — M25.561 CHRONIC PAIN OF RIGHT KNEE: Primary | ICD-10-CM

## 2023-02-16 DIAGNOSIS — G89.29 CHRONIC PAIN OF RIGHT KNEE: Primary | ICD-10-CM

## 2023-02-16 NOTE — PROGRESS NOTES
PT Discharge    Today's date: 2023  Patient name: Olimpia Bonilla  : 1945   MRN: [id number]  Referring provider: Junie Waldrop MD  Dx:   Encounter Diagnosis     ICD-10-CM    1  Chronic pain of right knee  M25 561     G89 29       2  Status post total right knee replacement  Z96 651                      Assessment  Assessment details: Marty Alcantar is a 69 y o  male who presented with signs and symptoms consistent of referring diagnosis  Patient has made excellent progress during his time at [facility]  Patient has made large functional gains in ROM, strength, motor control, and mobility which has led to increased activity tolerance  Patient has met all of his functional and rehabilitation goals with FOTO surpassing expected score  Patient was provided with a comprehensive HEP and educated on importance of performing HEP to minimize future complaints  Patient was receptive to information provided by therapist  At this time, patient is agreeable to and appropriate for d/c from therapy services  Thank you for this referral  D/c POC  Understanding of Dx/Px/POC: good   Prognosis: good    Goals  1  Decrease pain by 50% in 4 weeks to improve tolerance to prolonged amb, standing, and reciprocal stair climbing - met   2  Increase left lower extremity strength golbally to 4/5 in 6 weeks  - progressing  3  Increase left hip abductor and external rotator strength strength to 4+/5  6 weeks  - met   4  Pt will demonstrate 10- 20 deg improvement in Left knee flex/ext  to increase tolerance to getting into/out of her chair, and putting on shoes/socks- met     1  Return to Prior Level of Function in 8 weeks  2  Pt will demonstrate [place] with progressive home exercise program to improve carryover and decrease recurrence of symptoms  - met  3  Pt will demonstrate 20% improvement in FOTO score to increase tolerance to functional activities - met  4   Pt will demonstrate 5/5 in LE strength to allow for improved tolerance to prolonged amb/standing in 6-8 weeks- met  5  Patient FOTO score will be greater than or equal to 69 by discharge  - met      Plan  Plan of Care beginning date: 2022  Plan of Care expiration date: 2023  Treatment plan discussed with: patient        Subjective Evaluation    History of Present Illness  Mechanism of injury: Supriya Ramos is a 70 yo male presenting to OP PT for his 1st post op appointment with surgery being 22  Patient notes a good transition with minimal pain or discomfort  Patient reports taking the pain medication as needed but has been regularly taking baby Asprin and a "muscle relaxer" at night  Patient reports he has been active in the home and has been using a recumbent bike at home regularly which he used "4" times yesterday  He has noted that with longer distance walking, he experiences buckling of the knee  He finds minimal pain with no radiation of sxs into the lower leg  Does note some swelling on the thigh but no pain is associated with this  He has been compliant with previously provided HEP  Recurrent probem    Quality of life: good    Pain  Current pain ratin  At best pain ratin  At worst pain ratin  Location: Right knee  Quality: hot  Relieving factors: rest    Social Support  Steps to enter house: yes  Lives in: trailer  Lives with: spouse    Hand dominance: right  Exercise history: intermittent amb/hiking       Diagnostic Tests  X-ray: abnormal  Treatments  Previous treatment: injection treatment  Patient Goals  Patient goals for therapy: increased strength, decreased pain, improved balance and return to sport/leisure activities      Kathleen oByle reports a perceived improvement of 150% since starting OPPT  Functional status measure is now 60  Patient notes all strengthening activities have gotten easier for him, walking, stairs, sit to stands, and notes large improvements in pain  Patient notes that kneeling has improved as well with improved confidence  Overall, patient has made steady progress toward goals and is appropriate for d/c at this time  Objective     Palpation     Right   No palpable tenderness to the distal biceps femoris, distal semimembranosus, distal semitendinosus, lateral gastrocnemius, medial gastrocnemius, vastus lateralis and vastus medialis  Tenderness     Right Knee   No tenderness in the quadriceps tendon and superior patella       Active Range of Motion   Left Knee   Normal active range of motion  Flexion: 135 degrees   Extension: 0 degrees     Right Knee   Flexion: 135 degrees   Extension: 0 degrees     Mobility   Patellar Mobility:     Right Knee   WFL: medial, lateral, superior and inferior    Strength/Myotome Testing     Left Knee   Flexion: 5  Prone flexion: 5  Extension: 5  Quadriceps contraction: good    Right Knee   Flexion: 5  Extension: 4+  Quadriceps contraction: good    Left Ankle/Foot   Dorsiflexion: 5  Plantar flexion: 5    Right Ankle/Foot   Dorsiflexion: 5  Plantar flexion: 5    Ambulation     Ambulation: Stairs   Ascend stairs: independent  Pattern: reciprocal  Railings: without rails  Descend stairs: independent  Pattern: reciprocal  Railings: without rails    Observational Gait     Additional Observational Gait Details  Ambulates with use of RW; impaired TKE as patient reports some instances of buckling- resolved ()     Functional Assessment        Comments  5 sit to stands: 8 seconds no UE      TU seconds    General Comments:      Knee Comments  Incision dry, intact with no increases in redness or discharge noted    Capillary refill WNL                  Precautions: TKA RLE to be completed 22      Manuals      Knee prom EB            Patellar PROM EB            Adhesive removal             Re-eval   EB      EB     Quad/knee STM EB EB EB EB         Neuro Re-Ed             Quad sets  20x3 " eccentric/concentric 20x3 " eccentric/concentric          Prone quad sets 20x3"  20x3" 4#           TKE   2x30 15#            SLS  3x30" R UE support  3x30" R UE support on biodex foam   3x30" R UE support biodex foam  4x30" R UE support        Foam stance             Standing hip abduction    2x10 gtb   On step x15       Standing hip flexion    2x10   On step x15        Standing hip extension     2x10 gtb    On step 15x       Knee extension machine               TRX Squat      2x10  2x10        LAQ   3x10  40x 5#   40x 10#       Hamstring curl              Ther Ex             Bike 8' upright  8' upright  8' upright 8' upright  10' upright  10' upright  10' upright 8' upright     Prone quad stretch 10x10"            Standing SLR             Gastroc st             Hs st             Mini squats             SAQ             Mini squats    2x10           Hamstring curl             Knee extension             Leg press     3x10 95#  2x10 110# x15 SL 70#  2x12 ea 80#  2x12 80# ea SL      SLR     2x10  2x10        Bridges             S/L abduction             Sit to stands     2x10 gtb around knees  From low table          Clamshells             Ther Activity             Step ups fwd/lat   2x10 fwd/lateral 4", 6"  2x10 up and over 4" and 6"  Lateral 8" x10 ea side         Lunges into tap At box      30x ea leg 12" UE support 2x10 2x UE support 6" 2x10 tapping the knee       Monster walks  5 laps gtb    5 laps rtb   3 laps gtb      Side steps  5 laps gtb   5 laps rtb    3 laps gtb       Resisted fwd walking    x10 22  5#  x10 25#  x10 30#        Resisted side steps      x5 ea side 17 5#  x5 ea side 17 5#        Retro walking   x10 22  5# x10 25#  x10 30#         Gait Training                                       Modalities             CP prn 5'

## 2023-03-13 DIAGNOSIS — E78.2 MIXED HYPERLIPIDEMIA: ICD-10-CM

## 2023-03-13 RX ORDER — ATORVASTATIN CALCIUM 20 MG/1
20 TABLET, FILM COATED ORAL DAILY
Qty: 90 TABLET | Refills: 2 | Status: SHIPPED | OUTPATIENT
Start: 2023-03-13 | End: 2024-07-16

## 2023-03-13 NOTE — TELEPHONE ENCOUNTER
Patient is calling for a refill on Levothyroxine 125mcg I do not see it in active orders?  Patient would like a 90 day refill

## 2023-03-18 ENCOUNTER — PATIENT MESSAGE (OUTPATIENT)
Dept: INTERNAL MEDICINE CLINIC | Facility: CLINIC | Age: 78
End: 2023-03-18

## 2023-03-20 ENCOUNTER — TELEPHONE (OUTPATIENT)
Dept: INTERNAL MEDICINE CLINIC | Facility: CLINIC | Age: 78
End: 2023-03-20

## 2023-03-20 DIAGNOSIS — E03.9 ACQUIRED HYPOTHYROIDISM: Primary | ICD-10-CM

## 2023-03-20 RX ORDER — LEVOTHYROXINE SODIUM 0.12 MG/1
125 TABLET ORAL
Qty: 90 TABLET | Refills: 0 | Status: SHIPPED | OUTPATIENT
Start: 2023-03-20

## 2023-03-20 NOTE — TELEPHONE ENCOUNTER
Pt called back to confirm that he takes levothyroxine 125mcg, needs refill today  They are traveling   Med is not in current meds but he says Dr Jeanne Arteaga does refill meds

## 2023-03-20 NOTE — TELEPHONE ENCOUNTER
Pt is requesting levothyroxine to be sent in but I do not see it on med list, wife says it needs to be sent in today, they are traveling   Pt's wife will call to confirm dose and call back

## 2023-09-20 DIAGNOSIS — E03.9 ACQUIRED HYPOTHYROIDISM: ICD-10-CM

## 2023-09-20 RX ORDER — LEVOTHYROXINE SODIUM 0.12 MG/1
125 TABLET ORAL
Qty: 90 TABLET | Refills: 0 | Status: SHIPPED | OUTPATIENT
Start: 2023-09-20

## 2023-12-22 ENCOUNTER — APPOINTMENT (OUTPATIENT)
Dept: LAB | Facility: CLINIC | Age: 78
End: 2023-12-22
Payer: COMMERCIAL

## 2023-12-22 DIAGNOSIS — I44.1 MOBITZ TYPE I WENCKEBACH ATRIOVENTRICULAR BLOCK: ICD-10-CM

## 2023-12-22 DIAGNOSIS — E66.01 OBESITY, MORBID (HCC): ICD-10-CM

## 2023-12-22 DIAGNOSIS — R93.1 AGATSTON CORONARY ARTERY CALCIUM SCORE GREATER THAN 400: ICD-10-CM

## 2023-12-22 DIAGNOSIS — I10 HYPERTENSION, ESSENTIAL: ICD-10-CM

## 2023-12-22 DIAGNOSIS — I48.0 PAF (PAROXYSMAL ATRIAL FIBRILLATION) (HCC): ICD-10-CM

## 2023-12-22 DIAGNOSIS — I50.41 ACUTE COMBINED SYSTOLIC AND DIASTOLIC CONGESTIVE HEART FAILURE (HCC): ICD-10-CM

## 2023-12-22 DIAGNOSIS — E78.5 DYSLIPIDEMIA: ICD-10-CM

## 2023-12-22 DIAGNOSIS — Z95.0 PACEMAKER: ICD-10-CM

## 2023-12-22 DIAGNOSIS — G47.33 OBSTRUCTIVE SLEEP APNEA: ICD-10-CM

## 2023-12-22 LAB
ALBUMIN SERPL BCP-MCNC: 4.3 G/DL (ref 3.5–5)
ALP SERPL-CCNC: 60 U/L (ref 34–104)
ALT SERPL W P-5'-P-CCNC: 12 U/L (ref 7–52)
ANION GAP SERPL CALCULATED.3IONS-SCNC: 9 MMOL/L
AST SERPL W P-5'-P-CCNC: 16 U/L (ref 13–39)
BILIRUB SERPL-MCNC: 0.8 MG/DL (ref 0.2–1)
BUN SERPL-MCNC: 21 MG/DL (ref 5–25)
CALCIUM SERPL-MCNC: 9.1 MG/DL (ref 8.4–10.2)
CHLORIDE SERPL-SCNC: 103 MMOL/L (ref 96–108)
CO2 SERPL-SCNC: 26 MMOL/L (ref 21–32)
CREAT SERPL-MCNC: 0.8 MG/DL (ref 0.6–1.3)
ERYTHROCYTE [DISTWIDTH] IN BLOOD BY AUTOMATED COUNT: 13.2 % (ref 11.6–15.1)
GFR SERPL CREATININE-BSD FRML MDRD: 86 ML/MIN/1.73SQ M
GLUCOSE P FAST SERPL-MCNC: 100 MG/DL (ref 65–99)
HCT VFR BLD AUTO: 45.1 % (ref 36.5–49.3)
HGB BLD-MCNC: 15 G/DL (ref 12–17)
MCH RBC QN AUTO: 31.5 PG (ref 26.8–34.3)
MCHC RBC AUTO-ENTMCNC: 33.3 G/DL (ref 31.4–37.4)
MCV RBC AUTO: 95 FL (ref 82–98)
PLATELET # BLD AUTO: 270 THOUSANDS/UL (ref 149–390)
PMV BLD AUTO: 10.5 FL (ref 8.9–12.7)
POTASSIUM SERPL-SCNC: 4.5 MMOL/L (ref 3.5–5.3)
PROT SERPL-MCNC: 6.8 G/DL (ref 6.4–8.4)
PSA SERPL-MCNC: 1.21 NG/ML (ref 0–4)
RBC # BLD AUTO: 4.76 MILLION/UL (ref 3.88–5.62)
SODIUM SERPL-SCNC: 138 MMOL/L (ref 135–147)
WBC # BLD AUTO: 4.93 THOUSAND/UL (ref 4.31–10.16)

## 2023-12-22 PROCEDURE — 36415 COLL VENOUS BLD VENIPUNCTURE: CPT

## 2023-12-22 PROCEDURE — 85027 COMPLETE CBC AUTOMATED: CPT

## 2023-12-22 PROCEDURE — 80053 COMPREHEN METABOLIC PANEL: CPT

## 2023-12-22 PROCEDURE — G0103 PSA SCREENING: HCPCS

## 2024-01-15 ENCOUNTER — RA CDI HCC (OUTPATIENT)
Dept: OTHER | Facility: HOSPITAL | Age: 79
End: 2024-01-15

## 2024-01-24 ENCOUNTER — RA CDI HCC (OUTPATIENT)
Dept: OTHER | Facility: HOSPITAL | Age: 79
End: 2024-01-24

## 2024-01-24 ENCOUNTER — OFFICE VISIT (OUTPATIENT)
Dept: INTERNAL MEDICINE CLINIC | Facility: CLINIC | Age: 79
End: 2024-01-24
Payer: COMMERCIAL

## 2024-01-24 VITALS
SYSTOLIC BLOOD PRESSURE: 132 MMHG | HEART RATE: 51 BPM | BODY MASS INDEX: 34.8 KG/M2 | TEMPERATURE: 98 F | OXYGEN SATURATION: 98 % | WEIGHT: 235 LBS | HEIGHT: 69 IN | DIASTOLIC BLOOD PRESSURE: 72 MMHG

## 2024-01-24 DIAGNOSIS — Z95.0 PACEMAKER: ICD-10-CM

## 2024-01-24 DIAGNOSIS — E78.2 MIXED HYPERLIPIDEMIA: ICD-10-CM

## 2024-01-24 DIAGNOSIS — I49.5 SICK SINUS SYNDROME (HCC): ICD-10-CM

## 2024-01-24 DIAGNOSIS — I48.0 PAF (PAROXYSMAL ATRIAL FIBRILLATION) (HCC): ICD-10-CM

## 2024-01-24 DIAGNOSIS — Z00.00 MEDICARE ANNUAL WELLNESS VISIT, SUBSEQUENT: ICD-10-CM

## 2024-01-24 DIAGNOSIS — E03.9 ACQUIRED HYPOTHYROIDISM: Primary | ICD-10-CM

## 2024-01-24 DIAGNOSIS — G47.33 OBSTRUCTIVE SLEEP APNEA SYNDROME: ICD-10-CM

## 2024-01-24 DIAGNOSIS — E66.9 OBESITY (BMI 30-39.9): ICD-10-CM

## 2024-01-24 PROBLEM — E66.01 OBESITY, MORBID (HCC): Status: RESOLVED | Noted: 2021-12-08 | Resolved: 2024-01-24

## 2024-01-24 PROBLEM — G47.30 SLEEP APNEA: Status: ACTIVE | Noted: 2024-01-24

## 2024-01-24 PROBLEM — I50.41 ACUTE COMBINED SYSTOLIC AND DIASTOLIC CONGESTIVE HEART FAILURE (HCC): Status: RESOLVED | Noted: 2024-01-24 | Resolved: 2024-01-24

## 2024-01-24 PROBLEM — I50.41 ACUTE COMBINED SYSTOLIC AND DIASTOLIC CONGESTIVE HEART FAILURE (HCC): Status: ACTIVE | Noted: 2024-01-24

## 2024-01-24 PROCEDURE — 99214 OFFICE O/P EST MOD 30 MIN: CPT | Performed by: INTERNAL MEDICINE

## 2024-01-24 PROCEDURE — G0439 PPPS, SUBSEQ VISIT: HCPCS | Performed by: INTERNAL MEDICINE

## 2024-01-24 RX ORDER — LEVOTHYROXINE SODIUM 0.12 MG/1
125 TABLET ORAL
Qty: 90 TABLET | Refills: 0 | Status: SHIPPED | OUTPATIENT
Start: 2024-01-24

## 2024-01-24 RX ORDER — MELOXICAM 15 MG/1
15 TABLET ORAL DAILY
COMMUNITY

## 2024-01-24 NOTE — PROGRESS NOTES
HCC coding opportunities    I73.9, I48.0     Chart Reviewed number of suggestions sent to Provider: 2     GR    Patients Insurance     Medicare Insurance: [employer]

## 2024-01-24 NOTE — PROGRESS NOTES
Assessment and Plan:     Problem List Items Addressed This Visit          Endocrine    Hypothyroidism - Primary    Relevant Medications    levothyroxine (Levoxyl) 125 mcg tablet    Other Relevant Orders    TSH, 3rd generation    T4, free       Respiratory    Sleep apnea       Cardiovascular and Mediastinum    Sick sinus syndrome (HCC)    PAF (paroxysmal atrial fibrillation) (HCC)       Other    Hyperlipidemia    Pacemaker    Obesity (BMI 30-39.9)     Other Visit Diagnoses       Medicare annual wellness visit, subsequent                 1. Acquired hypothyroidism  levothyroxine (Levoxyl) 125 mcg tablet    TSH, 3rd generation    T4, free    Continue levothyroxine 125 mcg.  Blood work ordered.      2. Mixed hyperlipidemia      Continue atorvastatin 20 mg daily.      3. Pacemaker        4. Sick sinus syndrome (HCC)        5. Obstructive sleep apnea syndrome      Continue CPAP.      6. Obesity (BMI 30-39.9)        7. Medicare annual wellness visit, subsequent        8. PAF (paroxysmal atrial fibrillation) (HCC)      Being monitored by cardiology.  Had some episodes last May.         Preventive health issues were discussed with patient, and age appropriate screening tests were ordered as noted in patient's After Visit Summary.  Personalized health advice and appropriate referrals for health education or preventive services given if needed, as noted in patient's After Visit Summary.     History of Present Illness:     Patient presents for a Medicare Wellness Visit    HPI   Patient Care Team:  Austin Orona MD as PCP - General (Internal Medicine)     Review of Systems:     Review of Systems   Constitutional:  Negative for appetite change, chills, fatigue and fever.   HENT:  Negative for sore throat and trouble swallowing.    Eyes:  Negative for redness.   Respiratory:  Negative for shortness of breath.    Cardiovascular:  Negative for chest pain and palpitations.   Gastrointestinal:  Negative for abdominal pain,  constipation and diarrhea.   Genitourinary:  Negative for dysuria and hematuria.   Musculoskeletal:  Negative for back pain and neck pain.   Skin:  Negative for rash.   Neurological:  Negative for seizures, weakness and headaches.   Hematological:  Negative for adenopathy.   Psychiatric/Behavioral:  Negative for confusion. The patient is not nervous/anxious.         Problem List:     Patient Active Problem List   Diagnosis    Hyperlipidemia    Hypothyroidism    Obesity, unspecified    Pacemaker    Sick sinus syndrome (HCC)    Transient global amnesia    Obesity (BMI 30-39.9)    Sleep apnea    PAF (paroxysmal atrial fibrillation) ([facility])      Past Medical and Surgical History:     Past Medical History:   Diagnosis Date    Acute combined systolic and diastolic congestive heart failure (HCC)     Allergic     Disease of thyroid gland     Obesity     Obesity, morbid (HCC)     Visual impairment 2019     Past Surgical History:   Procedure Laterality Date    CARDIAC PACEMAKER PLACEMENT      EYE SURGERY  02/2022    Cataracts    TONSILLECTOMY        Family History:     Family History   Problem Relation Age of Onset    Myasthenia gravis Mother     COPD Father     Heart disease Father       Social History:     Social History     Socioeconomic History    Marital status: /Civil Union     Spouse name: None    Number of children: None    Years of education: None    Highest education level: None   Occupational History    None   Tobacco Use    Smoking status: Never    Smokeless tobacco: Never    Tobacco comments:     Nonsmoker - As per [employer]    Vaping Use    Vaping status: Every Day   Substance and Sexual Activity    Alcohol use: Yes     Alcohol/week: 7.0 standard drinks of alcohol     Types: 7 Standard drinks or equivalent per week     Comment: socially     Drug use: Never    Sexual activity: Yes     Partners: Female     Birth control/protection: Surgical, Male Sterilization   Other Topics Concern    None   Social  History Narrative    None     Social Determinants of Health     Financial Resource Strain: Low Risk  (1/24/2024)    Overall Financial Resource Strain (CARDIA)     Difficulty of Paying Living Expenses: Not hard at all   Food Insecurity: Not on file   Transportation Needs: No Transportation Needs (1/24/2024)    PRAPARE - Transportation     Lack of Transportation (Medical): No     Lack of Transportation (Non-Medical): No   Physical Activity: Not on file   Stress: Not on file   Social Connections: Not on file   Intimate Partner Violence: Not on file   Housing Stability: Not on file      Medications and Allergies:     Current Outpatient Medications   Medication Sig Dispense Refill    atorvastatin (LIPITOR) 20 mg tablet Take 1 tablet (20 mg total) by mouth daily 90 tablet 0    levothyroxine (Levoxyl) 125 mcg tablet Take 1 tablet (125 mcg total) by mouth daily in the early morning 90 tablet 0    Red Yeast Rice 600 MG CAPS Take 600 mg by mouth 2 (two) times a day      Saw Palmetto, Serenoa repens, (SAW PALMETTO PO) Take 1 Dose by mouth      aspirin (ECOTRIN LOW STRENGTH) 81 mg EC tablet Take 81 mg by mouth 2 (two) times a day      meloxicam (MOBIC) 15 mg tablet Take 15 mg by mouth daily       No current facility-administered medications for this visit.     No Known Allergies   Immunizations:     Immunization History   Administered Date(s) Administered    COVID-19 MODERNA VACC 0.25 ML IM BOOSTER 12/08/2021    COVID-19 MODERNA VACC 0.5 ML IM 01/22/2021, 02/17/2021    Td (adult), Unspecified 08/06/2021    Tdap 02/25/2014      Health Maintenance:         Topic Date Due    Hepatitis C Screening  Never done    Colorectal Cancer Screening  Discontinued         Topic Date Due    Pneumococcal Vaccine: 65+ Years (1 - PCV) Never done    Influenza Vaccine (1) Never done    COVID-19 Vaccine (4 - 2023-24 season) 09/01/2023      Medicare Screening Tests and Risk Assessments:     Lauri is here for his Subsequent Wellness visit. Last  Medicare Wellness visit information reviewed, patient interviewed and updates made to the record today.      Health Risk Assessment:   Patient rates overall health as very good. Patient feels that their physical health rating is same. Patient is very satisfied with their life. Eyesight was rated as slightly worse. Hearing was rated as same. Patient feels that their emotional and mental health rating is same. Patients states they are never, rarely angry. Patient states they are never, rarely unusually tired/fatigued. Pain experienced in the last 7 days has been some. Patient's pain rating has been 3/10. Patient states that he has experienced weight loss or gain in last 6 months.     Depression Screening:   PHQ-2 Score: 0      Fall Risk Screening:   In the past year, patient has experienced: history of falling in past year      Home Safety:  Patient does not have trouble with stairs inside or outside of their home. Patient has working smoke alarms and has working carbon monoxide detector. Home safety hazards include: none.     Nutrition:   Current diet is Regular.     Medications:   Patient is currently taking over-the-counter supplements. OTC medications include: see medication list. Patient is able to manage medications.     Activities of Daily Living (ADLs)/Instrumental Activities of Daily Living (IADLs):   Walk and transfer into and out of bed and chair?: Yes  Dress and groom yourself?: Yes    Bathe or shower yourself?: Yes    Feed yourself? Yes  Do your laundry/housekeeping?: Yes  Manage your money, pay your bills and track your expenses?: Yes  Make your own meals?: Yes    Do your own shopping?: Yes    Durable Medical Equipment Suppliers  [employer]    Previous Hospitalizations:   Any hospitalizations or ED visits within the last 12 months?: No      Advance Care Planning:   Living will: Yes    Durable POA for healthcare: Yes    Advanced directive: Yes      PREVENTIVE SCREENINGS      Cardiovascular Screening:     General: Screening Not Indicated and History Lipid Disorder      Diabetes Screening:     General: Screening Current      Prostate Cancer Screening:    General: Screening Not Indicated      Lung Cancer Screening:     General: Screening Not Indicated    Screening, Brief Intervention, and Referral to Treatment (SBIRT)    Screening  Typical number of drinks in a day: 2  Typical number of drinks in a week: 14  Interpretation: Low risk drinking behavior.    AUDIT-C Screenin) How often did you have a drink containing alcohol in the past year? 4 or more times a week  2) How many drinks did you have on a typical day when you were drinking in the past year? 1 to 2  3) How often did you have 6 or more drinks on one occasion in the past year? never    AUDIT-C Score: 4  Interpretation: Score 4-12 (male): POSITIVE screen for alcohol misuse    AUDIT Screenin) How often during the last year have you found that you were not able to stop drinking once you had started? 0 - never  5) How often during the last year have you failed to do what was normally expected from you because of drinking? 0 - never  6) How often during the last year have you needed a first drink in the morning to get yourself going after a heavy drinking session? 0 - never  7) How often during the last year have you had a feeling of guilt or remorse after drinking? 0 - never  8) How often during the last year have you been unable to remember what happened the night before because you had been drinking? 0 - never  9) Have you or someone else been injured as a result of your drinking? 0 - no  10) Has a relative or friend or a doctor or another health worker been concerned about your drinking or suggested you cut down? 0 - no    AUDIT Score: 4  Interpretation: Low risk alcohol consumption    Single Item Drug Screening:  How often have you used an illegal drug (including marijuana) or a prescription medication for non-medical reasons in the past year?  "never    Single Item Drug Screen Score: 0  Interpretation: Negative screen for possible drug use disorder    Other Counseling Topics:   Car/seat belt/driving safety, skin self-exam, sunscreen and calcium and vitamin D intake and regular weightbearing exercise.     No results found.     Physical Exam:     /72 (BP Location: Left arm, Patient Position: Sitting, Cuff Size: Standard)   Pulse (!) 51   Temp 98 °F (36.7 °C) (Temporal)   Ht 5' 9\" (1.753 m)   Wt 107 kg (235 lb)   SpO2 98%   BMI 34.70 kg/m²     Physical Exam  Vitals and nursing note reviewed.   Constitutional:       General: He is not in acute distress.     Appearance: He is well-developed.   HENT:      Head: Normocephalic and atraumatic.   Eyes:      Conjunctiva/sclera: Conjunctivae normal.   Cardiovascular:      Rate and Rhythm: Normal rate and regular rhythm.      Heart sounds: No murmur heard.  Pulmonary:      Effort: Pulmonary effort is normal. No respiratory distress.      Breath sounds: Normal breath sounds.   Abdominal:      Palpations: Abdomen is soft.      Tenderness: There is no abdominal tenderness.   Musculoskeletal:         General: No swelling.      Cervical back: Normal range of motion and neck supple.   Skin:     General: Skin is warm and dry.      Capillary Refill: Capillary refill takes less than 2 seconds.   Neurological:      Mental Status: He is alert.   Psychiatric:         Mood and Affect: Mood normal.          Austin Orona MD  "

## 2024-01-25 ENCOUNTER — APPOINTMENT (OUTPATIENT)
Dept: LAB | Facility: CLINIC | Age: 79
End: 2024-01-25
Payer: COMMERCIAL

## 2024-01-25 DIAGNOSIS — E03.9 ACQUIRED HYPOTHYROIDISM: ICD-10-CM

## 2024-01-25 LAB
T4 FREE SERPL-MCNC: 0.7 NG/DL (ref 0.61–1.12)
TSH SERPL DL<=0.05 MIU/L-ACNC: 3.1 UIU/ML (ref 0.45–4.5)

## 2024-01-25 PROCEDURE — 84439 ASSAY OF FREE THYROXINE: CPT

## 2024-01-25 PROCEDURE — 36415 COLL VENOUS BLD VENIPUNCTURE: CPT

## 2024-01-25 PROCEDURE — 84443 ASSAY THYROID STIM HORMONE: CPT

## 2024-02-19 ENCOUNTER — RA CDI HCC (OUTPATIENT)
Dept: OTHER | Facility: HOSPITAL | Age: 79
End: 2024-02-19

## 2024-03-01 ENCOUNTER — OFFICE VISIT (OUTPATIENT)
Dept: INTERNAL MEDICINE CLINIC | Facility: CLINIC | Age: 79
End: 2024-03-01
Payer: COMMERCIAL

## 2024-03-01 VITALS
HEART RATE: 54 BPM | DIASTOLIC BLOOD PRESSURE: 78 MMHG | SYSTOLIC BLOOD PRESSURE: 124 MMHG | TEMPERATURE: 98.9 F | OXYGEN SATURATION: 95 % | BODY MASS INDEX: 34.8 KG/M2 | HEIGHT: 69 IN | WEIGHT: 235 LBS

## 2024-03-01 DIAGNOSIS — Z95.0 PACEMAKER: ICD-10-CM

## 2024-03-01 DIAGNOSIS — E66.01 OBESITY, MORBID (HCC): ICD-10-CM

## 2024-03-01 DIAGNOSIS — G47.33 OSA (OBSTRUCTIVE SLEEP APNEA): ICD-10-CM

## 2024-03-01 DIAGNOSIS — E03.9 ACQUIRED HYPOTHYROIDISM: Primary | ICD-10-CM

## 2024-03-01 DIAGNOSIS — E78.2 MIXED HYPERLIPIDEMIA: ICD-10-CM

## 2024-03-01 PROCEDURE — G2211 COMPLEX E/M VISIT ADD ON: HCPCS | Performed by: INTERNAL MEDICINE

## 2024-03-01 PROCEDURE — 99214 OFFICE O/P EST MOD 30 MIN: CPT | Performed by: INTERNAL MEDICINE

## 2024-03-01 NOTE — PROGRESS NOTES
Assessment/Plan:           1. Acquired hypothyroidism  Comments:  Stable continue levothyroxine.    2. LLIIA (obstructive sleep apnea)  Comments:  Continue CPAP.    3. Obesity, morbid (HCC)    4. Pacemaker    5. Mixed hyperlipidemia  Comments:  Continue atorvastatin 20 mg daily.           1. Acquired hypothyroidism      2. LILIA (obstructive sleep apnea)      3. Obesity, morbid (HCC)      4. Pacemaker      5. Mixed hyperlipidemia         No problem-specific Assessment & Plan notes found for this encounter.           Subjective:      Patient ID: Lauri Juárez is a 79 y.o. male.    HPI    The following portions of the patient's history were reviewed and updated as appropriate: He  has a past medical history of Acute combined systolic and diastolic congestive heart failure (HCC), Allergic, Disease of thyroid gland, Obesity, Obesity, morbid (HCC), and Visual impairment (2019).  He   Patient Active Problem List    Diagnosis Date Noted    Sleep apnea 01/24/2024    PAF (paroxysmal atrial fibrillation) ([facility]) 12/12/2022    Obesity (BMI 30-39.9) 03/10/2021    Hyperlipidemia 12/02/2020    Transient global amnesia 11/19/2019    Pacemaker 08/22/2016    Sick sinus syndrome (HCC) 08/15/2016    Obesity, unspecified 03/07/2014    Hypothyroidism 12/19/2013     He  has a past surgical history that includes Tonsillectomy; Cardiac pacemaker placement; and Eye surgery (02/2022).  His family history includes COPD in his father; Heart disease in his father; Myasthenia gravis in his mother.  He  reports that he has never smoked. He has never used smokeless tobacco. He reports current alcohol use of about 7.0 standard drinks of alcohol per week. He reports that he does not use drugs.  Current Outpatient Medications   Medication Sig Dispense Refill    atorvastatin (LIPITOR) 20 mg tablet Take 1 tablet (20 mg total) by mouth daily 90 tablet 0    levothyroxine (Levoxyl) 125 mcg tablet Take 1 tablet (125 mcg total) by mouth daily in the early morning  90 tablet 0    meloxicam (MOBIC) 15 mg tablet Take 15 mg by mouth daily      Red Yeast Rice 600 MG CAPS Take 600 mg by mouth 2 (two) times a day      Saw Palmetto, Serenoa repens, (SAW PALMETTO PO) Take 1 Dose by mouth      aspirin (ECOTRIN LOW STRENGTH) 81 mg EC tablet Take 81 mg by mouth 2 (two) times a day       No current facility-administered medications for this visit.     Current Outpatient Medications on File Prior to Visit   Medication Sig    atorvastatin (LIPITOR) 20 mg tablet Take 1 tablet (20 mg total) by mouth daily    levothyroxine (Levoxyl) 125 mcg tablet Take 1 tablet (125 mcg total) by mouth daily in the early morning    meloxicam (MOBIC) 15 mg tablet Take 15 mg by mouth daily    Red Yeast Rice 600 MG CAPS Take 600 mg by mouth 2 (two) times a day    Saw Palmetto, Serenoa repens, (SAW PALMETTO PO) Take 1 Dose by mouth    aspirin (ECOTRIN LOW STRENGTH) 81 mg EC tablet Take 81 mg by mouth 2 (two) times a day     No current facility-administered medications on file prior to visit.     There are no discontinued medications.   He has No Known Allergies..    Review of Systems   Constitutional:  Negative for appetite change, chills, fatigue and fever.   HENT:  Negative for sore throat and trouble swallowing.    Eyes:  Negative for redness.   Respiratory:  Negative for shortness of breath.    Cardiovascular:  Negative for chest pain and palpitations.   Gastrointestinal:  Negative for abdominal pain, constipation and diarrhea.   Genitourinary:  Negative for dysuria and hematuria.   Musculoskeletal:  Negative for back pain and neck pain.   Skin:  Negative for rash.   Neurological:  Negative for seizures, weakness and headaches.   Hematological:  Negative for adenopathy.   Psychiatric/Behavioral:  Negative for confusion. The patient is not nervous/anxious.          Objective:      /78 (BP Location: Left arm, Patient Position: Sitting, Cuff Size: Standard)   Pulse (!) 54   Temp 98.9 °F (37.2 °C)  "(Temporal)   Ht 5' 9\" (1.753 m)   Wt 107 kg (235 lb)   SpO2 95%   BMI 34.70 kg/m²     Results Reviewed       None            Recent Results (from the past 1344 hour(s))   TSH, 3rd generation    Collection Time: 01/25/24  7:49 AM   Result Value Ref Range    TSH 3RD GENERATON 3.095 0.450 - 4.500 uIU/mL   T4, free    Collection Time: 01/25/24  7:49 AM   Result Value Ref Range    Free T4 0.70 0.61 - 1.12 ng/dL        Physical Exam  Constitutional:       General: He is not in acute distress.     Appearance: Normal appearance. He is obese.   HENT:      Head: Normocephalic and atraumatic.      Right Ear: Tympanic membrane normal.      Left Ear: Tympanic membrane normal.      Nose: Nose normal.      Mouth/Throat:      Mouth: Mucous membranes are moist.   Eyes:      Extraocular Movements: Extraocular movements intact.      Pupils: Pupils are equal, round, and reactive to light.   Cardiovascular:      Rate and Rhythm: Normal rate and regular rhythm.      Pulses: Normal pulses.      Heart sounds: Normal heart sounds. No murmur heard.     No friction rub.   Pulmonary:      Effort: Pulmonary effort is normal. No respiratory distress.      Breath sounds: Normal breath sounds. No wheezing.   Abdominal:      General: Abdomen is flat. Bowel sounds are normal. There is no distension.      Palpations: Abdomen is soft. There is no mass.      Tenderness: There is no abdominal tenderness. There is no guarding.   Musculoskeletal:         General: Normal range of motion.      Cervical back: Normal range of motion.   Skin:     General: Skin is warm.   Neurological:      General: No focal deficit present.      Mental Status: He is alert and oriented to person, place, and time. Mental status is at baseline.      Cranial Nerves: No cranial nerve deficit.   Psychiatric:         Mood and Affect: Mood normal.         Behavior: Behavior normal.         "

## 2024-03-07 DIAGNOSIS — E03.9 ACQUIRED HYPOTHYROIDISM: ICD-10-CM

## 2024-03-07 RX ORDER — LEVOTHYROXINE SODIUM 0.12 MG/1
125 TABLET ORAL
Qty: 90 TABLET | Refills: 0 | Status: SHIPPED | OUTPATIENT
Start: 2024-03-07

## 2024-07-16 ENCOUNTER — TELEPHONE (OUTPATIENT)
Dept: INTERNAL MEDICINE CLINIC | Facility: CLINIC | Age: 79
End: 2024-07-16

## 2024-09-08 DIAGNOSIS — E03.9 ACQUIRED HYPOTHYROIDISM: ICD-10-CM

## 2024-09-08 DIAGNOSIS — E78.2 MIXED HYPERLIPIDEMIA: ICD-10-CM

## 2024-09-09 RX ORDER — LEVOTHYROXINE SODIUM 125 UG/1
125 TABLET ORAL
Qty: 90 TABLET | Refills: 1 | Status: SHIPPED | OUTPATIENT
Start: 2024-09-09

## 2024-09-09 RX ORDER — ATORVASTATIN CALCIUM 20 MG/1
20 TABLET, FILM COATED ORAL DAILY
Qty: 90 TABLET | Refills: 1 | Status: SHIPPED | OUTPATIENT
Start: 2024-09-09 | End: 2026-01-13

## 2024-11-04 ENCOUNTER — RA CDI HCC (OUTPATIENT)
Dept: OTHER | Facility: HOSPITAL | Age: 79
End: 2024-11-04

## 2024-11-04 NOTE — PROGRESS NOTES
HCC coding opportunities          Chart Reviewed number of suggestions sent to Provider: 1  I48.0     Patients Insurance     Medicare Insurance: [employer] Ad[employer]age

## 2024-11-11 ENCOUNTER — CONSULT (OUTPATIENT)
Dept: INTERNAL MEDICINE CLINIC | Facility: CLINIC | Age: 79
End: 2024-11-11
Payer: COMMERCIAL

## 2024-11-11 VITALS
BODY MASS INDEX: 35.28 KG/M2 | TEMPERATURE: 98.6 F | OXYGEN SATURATION: 97 % | HEART RATE: 68 BPM | SYSTOLIC BLOOD PRESSURE: 148 MMHG | WEIGHT: 238.2 LBS | DIASTOLIC BLOOD PRESSURE: 70 MMHG | HEIGHT: 69 IN

## 2024-11-11 DIAGNOSIS — M16.11 PRIMARY OSTEOARTHRITIS OF RIGHT HIP: ICD-10-CM

## 2024-11-11 DIAGNOSIS — I49.5 SICK SINUS SYNDROME (HCC): ICD-10-CM

## 2024-11-11 DIAGNOSIS — E66.09 CLASS 2 OBESITY DUE TO EXCESS CALORIES WITHOUT SERIOUS COMORBIDITY WITH BODY MASS INDEX (BMI) OF 37.0 TO 37.9 IN ADULT: ICD-10-CM

## 2024-11-11 DIAGNOSIS — E03.9 ACQUIRED HYPOTHYROIDISM: ICD-10-CM

## 2024-11-11 DIAGNOSIS — Z01.818 PRE-OP EXAMINATION: Primary | ICD-10-CM

## 2024-11-11 DIAGNOSIS — I48.0 PAF (PAROXYSMAL ATRIAL FIBRILLATION) (HCC): ICD-10-CM

## 2024-11-11 DIAGNOSIS — E66.812 CLASS 2 OBESITY DUE TO EXCESS CALORIES WITHOUT SERIOUS COMORBIDITY WITH BODY MASS INDEX (BMI) OF 37.0 TO 37.9 IN ADULT: ICD-10-CM

## 2024-11-11 PROCEDURE — 99214 OFFICE O/P EST MOD 30 MIN: CPT | Performed by: INTERNAL MEDICINE

## 2024-11-11 PROCEDURE — 93000 ELECTROCARDIOGRAM COMPLETE: CPT | Performed by: INTERNAL MEDICINE

## 2024-11-11 RX ORDER — CELECOXIB 200 MG/1
200 CAPSULE ORAL ONCE
COMMUNITY
Start: 2024-11-11

## 2024-11-11 RX ORDER — MUPIROCIN 20 MG/G
OINTMENT TOPICAL
COMMUNITY
Start: 2024-11-11

## 2024-11-11 NOTE — PROGRESS NOTES
Assessment/Plan:           1. Pre-op examination  -     POCT ECG  2. Primary osteoarthritis of right hip  3. Sick sinus syndrome (HCC)  4. Acquired hypothyroidism  5. PAF (paroxysmal atrial fibrillation) (HCC)  6. Class 2 obesity due to excess calories without serious comorbidity with body mass index (BMI) of 37.0 to 37.9 in adult         1. Pre-op examination    - POCT ECG    2. Sick sinus syndrome (HCC)      3. Acquired hypothyroidism         No problem-specific Assessment & Plan notes found for this encounter.           Subjective:      Patient ID: Lauri Juárez is a 79 y.o. male.    HPI    The following portions of the patient's history were reviewed and updated as appropriate: He  has a past medical history of Acute combined systolic and diastolic congestive heart failure (HCC), Allergic, Disease of thyroid gland, Obesity, Obesity, morbid (HCC), and Visual impairment (2019).  He   Patient Active Problem List    Diagnosis Date Noted    Sleep apnea 01/24/2024    PAF (paroxysmal atrial fibrillation) (HCC) 12/12/2022    Obesity (BMI 30-39.9) 03/10/2021    Hyperlipidemia 12/02/2020    Transient global amnesia 11/19/2019    Pacemaker 08/22/2016    Sick sinus syndrome (HCC) 08/15/2016    Obesity, unspecified 03/07/2014    Hypothyroidism 12/19/2013     He  has a past surgical history that includes Tonsillectomy; Cardiac pacemaker placement; and Eye surgery (02/2022).  His family history includes COPD in his father; Heart disease in his father; Myasthenia gravis in his mother.  He  reports that he has never smoked. He has never used smokeless tobacco. He reports current alcohol use of about 7.0 standard drinks of alcohol per week. He reports that he does not use drugs.  Current Outpatient Medications   Medication Sig Dispense Refill    atorvastatin (LIPITOR) 20 mg tablet Take 1 tablet (20 mg total) by mouth daily 90 tablet 1    celecoxib (CeleBREX) 200 mg capsule Take 200 mg by mouth 1 (one) time      levothyroxine  (Levoxyl) 125 mcg tablet Take 1 tablet (125 mcg total) by mouth daily in the early morning 90 tablet 1    meloxicam (MOBIC) 15 mg tablet Take 0.5 tablets (7.5 mg total) by mouth daily as needed for moderate pain 30 tablet 0    mupirocin (BACTROBAN) 2 % ointment       Red Yeast Rice 600 MG CAPS Take 600 mg by mouth 2 (two) times a day      Saw Palmetto, Serenoa repens, (SAW PALMETTO PO) Take 1 Dose by mouth      aspirin (ECOTRIN LOW STRENGTH) 81 mg EC tablet Take 81 mg by mouth 2 (two) times a day       No current facility-administered medications for this visit.     Current Outpatient Medications on File Prior to Visit   Medication Sig    atorvastatin (LIPITOR) 20 mg tablet Take 1 tablet (20 mg total) by mouth daily    celecoxib (CeleBREX) 200 mg capsule Take 200 mg by mouth 1 (one) time    levothyroxine (Levoxyl) 125 mcg tablet Take 1 tablet (125 mcg total) by mouth daily in the early morning    meloxicam (MOBIC) 15 mg tablet Take 0.5 tablets (7.5 mg total) by mouth daily as needed for moderate pain    mupirocin (BACTROBAN) 2 % ointment     Red Yeast Rice 600 MG CAPS Take 600 mg by mouth 2 (two) times a day    Saw Palmetto, Serenoa repens, (SAW PALMETTO PO) Take 1 Dose by mouth    aspirin (ECOTRIN LOW STRENGTH) 81 mg EC tablet Take 81 mg by mouth 2 (two) times a day     No current facility-administered medications on file prior to visit.     There are no discontinued medications.   He has No Known Allergies..    Review of Systems   Constitutional:  Negative for appetite change, chills, fatigue and fever.   HENT:  Negative for sore throat and trouble swallowing.    Eyes:  Negative for redness.   Respiratory:  Negative for shortness of breath.    Cardiovascular:  Negative for chest pain and palpitations.   Gastrointestinal:  Negative for abdominal pain, constipation and diarrhea.   Genitourinary:  Negative for dysuria and hematuria.   Musculoskeletal:  Positive for arthralgias and gait problem. Negative for back pain  "and neck pain.   Skin:  Negative for rash.   Neurological:  Negative for seizures, weakness and headaches.   Hematological:  Negative for adenopathy.   Psychiatric/Behavioral:  Negative for confusion. The patient is not nervous/anxious.          Objective:      /70 (BP Location: Left arm, Patient Position: Sitting, Cuff Size: Large)   Pulse 68   Temp 98.6 °F (37 °C) (Oral)   Ht 5' 9\" (1.753 m)   Wt 108 kg (238 lb 3.2 oz)   SpO2 97% Comment: ra  BMI 35.18 kg/m²     Results Reviewed       None            No results found for this or any previous visit (from the past 1344 hour(s)).     Physical Exam  Constitutional:       General: He is not in acute distress.     Appearance: Normal appearance. He is obese.   HENT:      Head: Normocephalic and atraumatic.      Nose: Nose normal.      Mouth/Throat:      Mouth: Mucous membranes are moist.   Eyes:      Extraocular Movements: Extraocular movements intact.      Pupils: Pupils are equal, round, and reactive to light.   Cardiovascular:      Rate and Rhythm: Normal rate and regular rhythm.      Pulses: Normal pulses.      Heart sounds: Normal heart sounds. No murmur heard.     No friction rub.   Pulmonary:      Effort: Pulmonary effort is normal. No respiratory distress.      Breath sounds: Normal breath sounds. No wheezing.   Abdominal:      General: Abdomen is flat. Bowel sounds are normal. There is no distension.      Palpations: Abdomen is soft. There is no mass.      Tenderness: There is no abdominal tenderness. There is no guarding.   Musculoskeletal:         General: Normal range of motion.      Cervical back: Normal range of motion and neck supple.   Neurological:      General: No focal deficit present.      Mental Status: He is alert and oriented to person, place, and time. Mental status is at baseline.      Cranial Nerves: No cranial nerve deficit.      Gait: Gait abnormal.   Psychiatric:         Mood and Affect: Mood normal.         Behavior: Behavior " normal.

## 2024-11-12 ENCOUNTER — APPOINTMENT (OUTPATIENT)
Dept: LAB | Facility: CLINIC | Age: 79
End: 2024-11-12
Payer: COMMERCIAL

## 2024-11-12 ENCOUNTER — TRANSCRIBE ORDERS (OUTPATIENT)
Dept: LAB | Facility: CLINIC | Age: 79
End: 2024-11-12

## 2024-11-12 DIAGNOSIS — Z01.89 LABORATORY PROCEDURE: ICD-10-CM

## 2024-11-12 DIAGNOSIS — Z01.818 OTHER SPECIFIED PRE-OPERATIVE EXAMINATION: ICD-10-CM

## 2024-11-12 DIAGNOSIS — Z01.818 OTHER SPECIFIED PRE-OPERATIVE EXAMINATION: Primary | ICD-10-CM

## 2024-11-12 LAB
ALBUMIN SERPL BCG-MCNC: 4.2 G/DL (ref 3.5–5)
ALP SERPL-CCNC: 69 U/L (ref 34–104)
ALT SERPL W P-5'-P-CCNC: 14 U/L (ref 7–52)
ANION GAP SERPL CALCULATED.3IONS-SCNC: 8 MMOL/L (ref 4–13)
AST SERPL W P-5'-P-CCNC: 14 U/L (ref 13–39)
BASOPHILS # BLD AUTO: 0.05 THOUSANDS/ÂΜL (ref 0–0.1)
BASOPHILS NFR BLD AUTO: 1 % (ref 0–1)
BILIRUB SERPL-MCNC: 0.6 MG/DL (ref 0.2–1)
BUN SERPL-MCNC: 22 MG/DL (ref 5–25)
CALCIUM SERPL-MCNC: 9.1 MG/DL (ref 8.4–10.2)
CHLORIDE SERPL-SCNC: 101 MMOL/L (ref 96–108)
CO2 SERPL-SCNC: 28 MMOL/L (ref 21–32)
CREAT SERPL-MCNC: 0.82 MG/DL (ref 0.6–1.3)
EOSINOPHIL # BLD AUTO: 0.27 THOUSAND/ÂΜL (ref 0–0.61)
EOSINOPHIL NFR BLD AUTO: 5 % (ref 0–6)
ERYTHROCYTE [DISTWIDTH] IN BLOOD BY AUTOMATED COUNT: 13.7 % (ref 11.6–15.1)
GFR SERPL CREATININE-BSD FRML MDRD: 84 ML/MIN/1.73SQ M
GLUCOSE P FAST SERPL-MCNC: 87 MG/DL (ref 65–99)
HCT VFR BLD AUTO: 44.8 % (ref 36.5–49.3)
HGB BLD-MCNC: 15 G/DL (ref 12–17)
IMM GRANULOCYTES # BLD AUTO: 0.05 THOUSAND/UL (ref 0–0.2)
IMM GRANULOCYTES NFR BLD AUTO: 1 % (ref 0–2)
LYMPHOCYTES # BLD AUTO: 1.65 THOUSANDS/ÂΜL (ref 0.6–4.47)
LYMPHOCYTES NFR BLD AUTO: 29 % (ref 14–44)
MCH RBC QN AUTO: 30.9 PG (ref 26.8–34.3)
MCHC RBC AUTO-ENTMCNC: 33.5 G/DL (ref 31.4–37.4)
MCV RBC AUTO: 92 FL (ref 82–98)
MONOCYTES # BLD AUTO: 0.43 THOUSAND/ÂΜL (ref 0.17–1.22)
MONOCYTES NFR BLD AUTO: 8 % (ref 4–12)
NEUTROPHILS # BLD AUTO: 3.32 THOUSANDS/ÂΜL (ref 1.85–7.62)
NEUTS SEG NFR BLD AUTO: 56 % (ref 43–75)
NRBC BLD AUTO-RTO: 0 /100 WBCS
PLATELET # BLD AUTO: 263 THOUSANDS/UL (ref 149–390)
PMV BLD AUTO: 10.4 FL (ref 8.9–12.7)
POTASSIUM SERPL-SCNC: 4.3 MMOL/L (ref 3.5–5.3)
PROT SERPL-MCNC: 6.8 G/DL (ref 6.4–8.4)
RBC # BLD AUTO: 4.86 MILLION/UL (ref 3.88–5.62)
SODIUM SERPL-SCNC: 137 MMOL/L (ref 135–147)
WBC # BLD AUTO: 5.77 THOUSAND/UL (ref 4.31–10.16)

## 2024-11-12 PROCEDURE — 85025 COMPLETE CBC W/AUTO DIFF WBC: CPT

## 2024-11-12 PROCEDURE — 80053 COMPREHEN METABOLIC PANEL: CPT

## 2024-11-12 PROCEDURE — 36415 COLL VENOUS BLD VENIPUNCTURE: CPT

## 2024-11-14 ENCOUNTER — TELEPHONE (OUTPATIENT)
Age: 79
End: 2024-11-14

## 2024-11-14 NOTE — TELEPHONE ENCOUNTER
Dionna with [facility] called. Received cardiac clearance from Dr Jensen and faxed copy to PCP office. Dionna denies further needs or questions at this time.

## 2024-11-20 ENCOUNTER — EVALUATION (OUTPATIENT)
Dept: PHYSICAL THERAPY | Facility: CLINIC | Age: 79
End: 2024-11-20
Payer: COMMERCIAL

## 2024-11-20 DIAGNOSIS — M16.11 UNILATERAL PRIMARY OSTEOARTHRITIS, RIGHT HIP: Primary | ICD-10-CM

## 2024-11-20 PROCEDURE — 97110 THERAPEUTIC EXERCISES: CPT

## 2024-11-20 PROCEDURE — 97161 PT EVAL LOW COMPLEX 20 MIN: CPT

## 2024-11-20 NOTE — PROGRESS NOTES
PT Evaluation     Today's date: 2024  Patient name: Lauri Juárez  : 1945  MRN: [id number]  Referring provider: Roman Michael MD  Dx:   Encounter Diagnosis     ICD-10-CM    1. Unilateral primary osteoarthritis, right hip  M16.11                      Assessment  Impairments: abnormal gait, abnormal or restricted ROM, activity intolerance, impaired physical strength, lacks appropriate home exercise program, pain with function, weight-bearing intolerance, participation limitations and activity limitations  Symptom irritability: moderate    Assessment details: José Luis Juárez is a 79 y.o. male who presents prior to a R ANTHONY on 24 with Dr. Michael. Patient presents with pain, decreased strength, decreased ROM and ambulatory dysfunction. Due to these impairments, Patient has difficulty performing a/iadls, recreational activities and engaging in social activities. Patient was educated on expectations of surgery, exercises to be completed at home prior to and after surgery, proper stair negotiation, hip precautions, and bed mobility. Patient verbalized understanding of information provided by therapist this date. Patient would benefit from a comprehensive HEP focusing on improving motor control, LE strength, ROM, and functional mobility. Patient would benefit from skilled physical therapy to address the impairments, improve their level of function, and to improve their overall quality of life. Patient will be seen in 10 days time after their surgery is completed. Thank you for this referral.    Understanding of Dx/Px/POC: good     Prognosis: good    Goals  TO BE COMPLETED AFTER SURGERY:      Short Term Goals: to be achieved by 4 weeks  1) Patient to be independent with basic HEP.  2) Decrease pain to 3/10 at its worst.  3) Increase pain free R hip ROM by 5-10 degrees   4) Increase LE strength by 1/2 MMT grade in all deficient planes.  5) TUG and 5x STS scores will improve by >MDIC    Long Term Goals: to be achieved  by discharge  1) FOTO equal to or greater than expected outcome.  2) Ambulation to improve to maximal level of function  3) Stair negotiation will improve to reciprocal.  4) Sit to stand transfers will improve to maximal level of function   5) Patient will be independent with comprehensive HEP.  6) Patient will be able to return to full prior activity without pain.       Plan  Patient would benefit from: PT eval and skilled physical therapy    Planned therapy interventions: activity modification, massage, manual therapy, neuromuscular re-education, strengthening, stretching, therapeutic activities, therapeutic exercise, patient/caregiver education, behavior modification, functional ROM exercises and home exercise program    Treatment plan discussed with: patient  Plan details: Patient was seen 1x prior to sx and will be seen following his surgery on 12/4/24.         Subjective Evaluation    History of Present Illness  Mechanism of injury: surgery  Mechanism of injury: History of Current Injury: Patient reports this date prior to R ANTHONY with Dr. Michael on 12/2/24. Over the last year, his R hip has gradually worsened over the last year to the point where his hip has greatly limited his overall activity level. He still tries to remain active but reports large limitations. He does have a first floor setup available and will use this following his sx. Patient will f/u after his scheduled sx.   Pain location/Descriptors: Lateral hip which radiates into the groin when at its worst   Aggravating factors: walking, standing, lifting the leg   Easing factors: cream, medications   Imaging: Yes, Xray   Special Questions: Lauri denies a new onset of Bladder incontinence, Bowel dysfunction, Dysphagia, Dysarthria, Diplopia, Ataxia, Tingling, Numbness, and Saddle anesthesia .  Patient goals:  improve pain, improve activity tolerance   Hobbies/Interest: going around the [place]   Occupation: Retired     Quality of life:  "good    Patient Goals  Patient goals for therapy: decreased pain, increased strength, return to sport/leisure activities and increased motion    Pain  Current pain ratin  At best pain ratin  At worst pain ratin  Quality: dull ache, tight and sharp    Social Support  Steps to enter house: yes  Stairs in house: yes   Lives in: multiple-level home          Objective     Active Range of Motion   Left Hip   Normal active range of motion    Right Hip   Flexion: 120 degrees with pain  Extension: -15 degrees with pain    Strength/Myotome Testing     Left Hip   Normal muscle strength    Right Hip   Planes of Motion   Flexion: 3  Extension: 3  Abduction: 4  Adduction: 5    Left Knee   Flexion: 5  Extension: 5  Quadriceps contraction: good    Right Knee   Flexion: 5  Extension: 5  Quadriceps contraction: good    Tests     Right Hip   Positive NEL, GI and alice.     Ambulation     Observational Gait   Gait: antalgic   Decreased walking speed and stride length.     Functional Assessment      Squat    Pain.     Forward Step Up 6\"     Right Leg  Pain.     Forward Step Down 6\"     Right Leg  Pain.              Diagnosis: R ANTHONY on 24 via Dr. Michael    Precautions:    POC Expires:   Re-evaluation Date:    FOTO Scores/Date: Goal -;    Visit Count 1/10       Manuals        Hip PROM                        Ther Ex        Bike/nustep         Heel slides         Quad sets  HEP       Glute sets  HEP       Hip iso         Glute bridge         Ankle pumps  HEP       Heel raises HEP        LAQ  HEP        Neuro Re-Ed                                                               Ther Act                                                                                 Modalities                                            "

## 2024-11-20 NOTE — LETTER
2024    Roman Michael MD  [address] [place] [address]    Patient: Lauri Juárez   YOB: 1945   Date of Visit: 2024     Encounter Diagnosis     ICD-10-CM    1. Unilateral primary osteoarthritis, right hip  M16.11           Dear Dr. Michael:    Thank you for your recent referral of Lauri Juárez. Please review the attached evaluation summary from Lauri's recent visit.     Please verify that you agree with the plan of care by signing the attached order.     If you have any questions or concerns, please do not hesitate to call.     I sincerely appreciate the opportunity to share in the care of one of your patients and hope to have another opportunity to work with you in the near future.       Sincerely,    Nacho Murphy, PT      Referring Provider:      I certify that I have read the below Plan of Care and certify the need for these services furnished under this plan of treatment while under my care.                    Roman Michael MD  [address] [place] [address]  Via Fax: [phone]          PT Evaluation     Today's date: 2024  Patient name: Lauri Juárez  : 1945  MRN: [id number]  Referring provider: Roman Michael MD  Dx:   Encounter Diagnosis     ICD-10-CM    1. Unilateral primary osteoarthritis, right hip  M16.11                      Assessment  Impairments: abnormal gait, abnormal or restricted ROM, activity intolerance, impaired physical strength, lacks appropriate home exercise program, pain with function, weight-bearing intolerance, participation limitations and activity limitations  Symptom irritability: moderate    Assessment details: José Luis Juárez is a 79 y.o. male who presents prior to a R ANTHONY on 24 with Dr. Michael. Patient presents with pain, decreased strength, decreased ROM and ambulatory dysfunction. Due to these impairments, Patient has difficulty performing a/iadls, recreational activities and engaging in social activities. Patient was educated  on expectations of surgery, exercises to be completed at home prior to and after surgery, proper stair negotiation, hip precautions, and bed mobility. Patient verbalized understanding of information provided by therapist this date. Patient would benefit from a comprehensive HEP focusing on improving motor control, LE strength, ROM, and functional mobility. Patient would benefit from skilled physical therapy to address the impairments, improve their level of function, and to improve their overall quality of life. Patient will be seen in 10 days time after their surgery is completed. Thank you for this referral.    Understanding of Dx/Px/POC: good     Prognosis: good    Goals  TO BE COMPLETED AFTER SURGERY:      Short Term Goals: to be achieved by 4 weeks  1) Patient to be independent with basic HEP.  2) Decrease pain to 3/10 at its worst.  3) Increase pain free R hip ROM by 5-10 degrees   4) Increase LE strength by 1/2 MMT grade in all deficient planes.  5) TUG and 5x STS scores will improve by >MDIC    Long Term Goals: to be achieved by discharge  1) FOTO equal to or greater than expected outcome.  2) Ambulation to improve to maximal level of function  3) Stair negotiation will improve to reciprocal.  4) Sit to stand transfers will improve to maximal level of function   5) Patient will be independent with comprehensive HEP.  6) Patient will be able to return to full prior activity without pain.       Plan  Patient would benefit from: PT eval and skilled physical therapy    Planned therapy interventions: activity modification, massage, manual therapy, neuromuscular re-education, strengthening, stretching, therapeutic activities, therapeutic exercise, patient/caregiver education, behavior modification, functional ROM exercises and home exercise program    Treatment plan discussed with: patient  Plan details: Patient was seen 1x prior to sx and will be seen following his surgery on 12/4/24.         Subjective  Evaluation    History of Present Illness  Mechanism of injury: surgery  Mechanism of injury: History of Current Injury: Patient reports this date prior to R ANTHONY with Dr. Michael on 24. Over the last year, his R hip has gradually worsened over the last year to the point where his hip has greatly limited his overall activity level. He still tries to remain active but reports large limitations. He does have a first floor setup available and will use this following his sx. Patient will f/u after his scheduled sx.   Pain location/Descriptors: Lateral hip which radiates into the groin when at its worst   Aggravating factors: walking, standing, lifting the leg   Easing factors: cream, medications   Imaging: Yes, Xray   Special Questions: Lauri denies a new onset of Bladder incontinence, Bowel dysfunction, Dysphagia, Dysarthria, Diplopia, Ataxia, Tingling, Numbness, and Saddle anesthesia .  Patient goals:  improve pain, improve activity tolerance   Hobbies/Interest: going around the country RV   Occupation: Retired     Quality of life: good    Patient Goals  Patient goals for therapy: decreased pain, increased strength, return to sport/leisure activities and increased motion    Pain  Current pain ratin  At best pain ratin  At worst pain ratin  Quality: dull ache, tight and sharp    Social Support  Steps to enter house: yes  Stairs in house: yes   Lives in: multiple-level home          Objective     Active Range of Motion   Left Hip   Normal active range of motion    Right Hip   Flexion: 120 degrees with pain  Extension: -15 degrees with pain    Strength/Myotome Testing     Left Hip   Normal muscle strength    Right Hip   Planes of Motion   Flexion: 3  Extension: 3  Abduction: 4  Adduction: 5    Left Knee   Flexion: 5  Extension: 5  Quadriceps contraction: good    Right Knee   Flexion: 5  Extension: 5  Quadriceps contraction: good    Tests     Right Hip   Positive NELGI and alice.     Ambulation  "    Observational Gait   Gait: antalgic   Decreased walking speed and stride length.     Functional Assessment      Squat    Pain.     Forward Step Up 6\"     Right Leg  Pain.     Forward Step Down 6\"     Right Leg  Pain.              Diagnosis: R ANTHONY on 12/2/24 via Dr. Michael    Precautions:    POC Expires:   Re-evaluation Date:    FOTO Scores/Date: Goal -;    Visit Count 1/10       Manuals 11/20       Hip PROM                        Ther Ex        Bike/nustep         Heel slides         Quad sets  HEP       Glute sets  HEP       Hip iso         Glute bridge         Ankle pumps  HEP       Heel raises HEP        LAQ  HEP        Neuro Re-Ed                                                               Ther Act                                                                                 Modalities                                                            "

## 2024-12-04 ENCOUNTER — APPOINTMENT (OUTPATIENT)
Dept: PHYSICAL THERAPY | Facility: CLINIC | Age: 79
End: 2024-12-04
Payer: COMMERCIAL

## 2024-12-06 ENCOUNTER — APPOINTMENT (OUTPATIENT)
Dept: PHYSICAL THERAPY | Facility: CLINIC | Age: 79
End: 2024-12-06
Payer: COMMERCIAL

## 2024-12-10 ENCOUNTER — APPOINTMENT (OUTPATIENT)
Dept: PHYSICAL THERAPY | Facility: CLINIC | Age: 79
End: 2024-12-10
Payer: COMMERCIAL

## 2024-12-12 ENCOUNTER — APPOINTMENT (OUTPATIENT)
Dept: PHYSICAL THERAPY | Facility: CLINIC | Age: 79
End: 2024-12-12
Payer: COMMERCIAL

## 2024-12-17 ENCOUNTER — APPOINTMENT (OUTPATIENT)
Dept: PHYSICAL THERAPY | Facility: CLINIC | Age: 79
End: 2024-12-17
Payer: COMMERCIAL

## 2024-12-19 ENCOUNTER — OFFICE VISIT (OUTPATIENT)
Dept: PHYSICAL THERAPY | Facility: CLINIC | Age: 79
End: 2024-12-19
Payer: COMMERCIAL

## 2024-12-19 ENCOUNTER — APPOINTMENT (OUTPATIENT)
Dept: PHYSICAL THERAPY | Facility: CLINIC | Age: 79
End: 2024-12-19
Payer: COMMERCIAL

## 2024-12-19 DIAGNOSIS — Z96.641 S/P TOTAL RIGHT HIP ARTHROPLASTY: Primary | ICD-10-CM

## 2024-12-19 PROCEDURE — 97110 THERAPEUTIC EXERCISES: CPT | Performed by: PHYSICAL THERAPIST

## 2024-12-19 PROCEDURE — 97164 PT RE-EVAL EST PLAN CARE: CPT | Performed by: PHYSICAL THERAPIST

## 2024-12-19 PROCEDURE — 97112 NEUROMUSCULAR REEDUCATION: CPT | Performed by: PHYSICAL THERAPIST

## 2024-12-19 NOTE — PROGRESS NOTES
PT Evaluation     Today's date: 2024  Patient name: Lauri Juárez  : 1945  MRN: [id number]  Referring provider: Roman Michael MD  Dx:   Encounter Diagnosis     ICD-10-CM    1. S/P total right hip arthroplasty  Z96.641           Start Time: 1505  Stop Time: 1550  Total time in clinic (min): 45 minutes    Assessment  Impairments: abnormal gait, abnormal or restricted ROM, activity intolerance, impaired physical strength, lacks appropriate home exercise program and pain with function  Symptom irritability: moderate    Assessment details: Lauri Juárez is a pleasant 79 y.o. male who presents s/p right ANTHONY performed 24.  The patient's greatest concerns are the pain he is experiencing and fear of not being able to keep active.      No further referral appears necessary at this time based upon examination results.    Primary movement impairment diagnosis of expected limitations in ROM and mm strength given the above surgical procedure resulting in pathoanatomical symptoms of decreased functional mobility and limiting his ability to drive, exercise or recreation, get out of a chair, perform household chores, perform yard work, stand, and walk.    Primary Impairments:  1) Decreased active and passive ROM of the R hip and LE   2) Decreased mm strength of the R hip and LE   3) Impaired gait mechanics and stair mobility     Etiologic factors include none recalled by the patient.    Understanding of Dx/Px/POC: good     Prognosis: good  Prognosis details: Positive prognostic indicators include positive attitude toward recovery and good understanding of diagnosis and treatment plan options.  Negative prognostic indicators include none.      Goals  Short Term Goals: to be achieved by 4 weeks  1) Patient to be independent with basic HEP.  2) Decrease pain to 3/10 at its worst.  3) Increase pain free R hip ROM by 5-10 degrees   4) Increase LE strength by 1/2 MMT grade in all deficient planes.  5) TUG and 5x STS scores  will improve by >MDIC     Long Term Goals: to be achieved by discharge  1) FOTO equal to or greater than expected outcome.  2) Ambulation to improve to maximal level of function  3) Stair negotiation will improve to reciprocal.  4) Sit to stand transfers will improve to maximal level of function   5) Patient will be independent with comprehensive HEP.  6) Patient will be able to return to full prior activity without pain.       Plan  Patient would benefit from: skilled physical therapy  Planned modality interventions: Modalities PRN.    Planned therapy interventions: activity modification, manual therapy, neuromuscular re-education, patient education, therapeutic activities, therapeutic exercise, graded activity, home exercise program, behavior modification, self care and balance    Frequency: 2x week  Duration in weeks: 6  Plan of Care expiration date: 1/30/2025  Treatment plan discussed with: patient        Subjective Evaluation    History of Present Illness  Date of surgery: 12/2/2024  Mechanism of injury: surgery  Mechanism of injury: Lauri Juárez presents s/p right posterior ANTHONY performed 12/2/24. Pt had approx 2 wks of home care and will now transition to OPPT. Denies N/T in the RLE. Aware of posterior hip precautions. First f/u with Dr. Michael scheduled for 1/9/25.   Pain location: right hip, descriptors: dull, ache, and tight  Aggravating factors: no specific activity   Relieving factors: no specific activity   24hr pain pattern: 0/10 (current), 0/10 (best), 2/10 (worst)   Imaging: X-ray  Previous treatments: previous physical therapy (s/p R TKA in 2023)   Occupation/recreation: retired; enjoys travelling in his RV, general house and yard work, playing golf, wants to return to walking more frequently   Sleeping: no disturbed sleep reported (wakes up every 1.5-2 hrs due to taking Flomax)   Patient concerns: decreasing pain, improving mobility, improving function, and improving strength  Special Questions: (-)  Red flag screening          Objective     Active Range of Motion     Right Hip   Flexion: 75 degrees   Abduction: 25 degrees     Strength/Myotome Testing     Left Hip   Normal muscle strength    Right Hip   Planes of Motion   Abduction: 4  Adduction: 4    Left Knee   Normal strength    Right Knee   Flexion: 4+  Extension: 4+  Quadriceps contraction: good    Left Ankle/Foot   Normal strength    Right Ankle/Foot   Normal strength    Ambulation     Ambulation: Level Surfaces   Ambulation without assistive device: independent    Observational Gait   Gait: antalgic     Additional Observational Gait Details  Slightly antalgic gait on the R, no use of AD for ambulation, will use SPC for community distances or balance     General Comments:      Hip Comments   TUG: 15.3 seconds, no AD   5xSTS: 19.3 seconds with use of armrests              Precautions:     Diagnosis: S/P R Posterior ANTHONY    Precautions: Posterior Hip Precautions, Pacemaker, PAF   POC: 12/19-1/30   Authorization: [employer]   Access Code:     Visit Count 1 2 3 4 5   Manuals 12/19       R Hip/LE PROM & Stretching                         Neuro Re-Ed        Quad Set         Glute Set         Bridge         SLR                         There Ex         Active warm up NuStep L1 10'       LAQ        Heel Raise         Standing Hip 3-way                                Ther Act             Step Up         Sit to Stand         Squat                                     Modalities                                   Assessment IE        Education S/S, POC, HEP

## 2024-12-19 NOTE — LETTER
2024    Roman Michael MD  [address] [place] [address]    Patient: Lauri Juárez   YOB: 1945   Date of Visit: 2024     Encounter Diagnosis     ICD-10-CM    1. S/P total right hip arthroplasty  Z96.641           Dear Dr. Michael:    Thank you for your recent referral of Lauri Juárez. Please review the attached evaluation summary from Lauri's recent visit.     Please verify that you agree with the plan of care by signing the attached order.     If you have any questions or concerns, please do not hesitate to call.     I sincerely appreciate the opportunity to share in the care of one of your patients and hope to have another opportunity to work with you in the near future.       Sincerely,    Jere Uriostegui, PT      Referring Provider:      I certify that I have read the below Plan of Care and certify the need for these services furnished under this plan of treatment while under my care.                    Roman Michael MD  [address] [place] [address]  Via Fax: [phone]          PT Evaluation     Today's date: 2024  Patient name: Lauri Juárez  : 1945  MRN: [id number]  Referring provider: Roman Michael MD  Dx:   Encounter Diagnosis     ICD-10-CM    1. S/P total right hip arthroplasty  Z96.641           Start Time: 1505  Stop Time: 1550  Total time in clinic (min): 45 minutes    Assessment  Impairments: abnormal gait, abnormal or restricted ROM, activity intolerance, impaired physical strength, lacks appropriate home exercise program and pain with function  Symptom irritability: moderate    Assessment details: Lauri Juárez is a pleasant 79 y.o. male who presents s/p right ANTHONY performed 24.  The patient's greatest concerns are the pain he is experiencing and fear of not being able to keep active.      No further referral appears necessary at this time based upon examination results.    Primary movement impairment diagnosis of expected limitations in ROM  and mm strength given the above surgical procedure resulting in pathoanatomical symptoms of decreased functional mobility and limiting his ability to drive, exercise or recreation, get out of a chair, perform household chores, perform yard work, stand, and walk.    Primary Impairments:  1) Decreased active and passive ROM of the R hip and LE   2) Decreased mm strength of the R hip and LE   3) Impaired gait mechanics and stair mobility     Etiologic factors include none recalled by the patient.    Understanding of Dx/Px/POC: good     Prognosis: good  Prognosis details: Positive prognostic indicators include positive attitude toward recovery and good understanding of diagnosis and treatment plan options.  Negative prognostic indicators include none.      Goals  Short Term Goals: to be achieved by 4 weeks  1) Patient to be independent with basic HEP.  2) Decrease pain to 3/10 at its worst.  3) Increase pain free R hip ROM by 5-10 degrees   4) Increase LE strength by 1/2 MMT grade in all deficient planes.  5) TUG and 5x STS scores will improve by >MDIC     Long Term Goals: to be achieved by discharge  1) FOTO equal to or greater than expected outcome.  2) Ambulation to improve to maximal level of function  3) Stair negotiation will improve to reciprocal.  4) Sit to stand transfers will improve to maximal level of function   5) Patient will be independent with comprehensive HEP.  6) Patient will be able to return to full prior activity without pain.       Plan  Patient would benefit from: skilled physical therapy  Planned modality interventions: Modalities PRN.    Planned therapy interventions: activity modification, manual therapy, neuromuscular re-education, patient education, therapeutic activities, therapeutic exercise, graded activity, home exercise program, behavior modification, self care and balance    Frequency: 2x week  Duration in weeks: 6  Plan of Care expiration date: 1/30/2025  Treatment plan discussed  with: patient        Subjective Evaluation    History of Present Illness  Date of surgery: 12/2/2024  Mechanism of injury: surgery  Mechanism of injury: Lauri Juárez presents s/p right posterior ANTHONY performed 12/2/24. Pt had approx 2 wks of home care and will now transition to OPPT. Denies N/T in the RLE. Aware of posterior hip precautions. First f/u with Dr. Michael scheduled for 1/9/25.   Pain location: right hip, descriptors: dull, ache, and tight  Aggravating factors: no specific activity   Relieving factors: no specific activity   24hr pain pattern: 0/10 (current), 0/10 (best), 2/10 (worst)   Imaging: X-ray  Previous treatments: previous physical therapy (s/p R TKA in 2023)   Occupation/recreation: retired; enjoys travelling in his [employer], general house and yard work, playing golf, wants to return to walking more frequently   Sleeping: no disturbed sleep reported (wakes up every 1.5-2 hrs due to taking Flomax)   Patient concerns: decreasing pain, improving mobility, improving function, and improving strength  Special Questions: (-) Red flag screening          Objective     Active Range of Motion     Right Hip   Flexion: 75 degrees   Abduction: 25 degrees     Strength/Myotome Testing     Left Hip   Normal muscle strength    Right Hip   Planes of Motion   Abduction: 4  Adduction: 4    Left Knee   Normal strength    Right Knee   Flexion: 4+  Extension: 4+  Quadriceps contraction: good    Left Ankle/Foot   Normal strength    Right Ankle/Foot   Normal strength    Ambulation     Ambulation: Level Surfaces   Ambulation without assistive device: independent    Observational Gait   Gait: antalgic     Additional Observational Gait Details  Slightly antalgic gait on the R, no use of AD for ambulation, will use SPC for community distances or balance     General Comments:      Hip Comments   TUG: 15.3 seconds, no AD   5xSTS: 19.3 seconds with use of armrests              Precautions:     Diagnosis: S/P R Posterior ANTHONY     Precautions: Posterior Hip Precautions, Pacemaker, PAF   POC: 12/19-1/30   Authorization: [employer]   Access Code:     Visit Count 1 2 3 4 5   Manuals 12/19       R Hip/LE PROM & Stretching                         Neuro Re-Ed        Quad Set         Glute Set         Bridge         SLR                         There Ex         Active warm up NuStep L1 10'       LAQ        Heel Raise         Standing Hip 3-way                                Ther Act             Step Up         Sit to Stand         Squat                                     Modalities                                   Assessment IE        Education S/S, POC, HEP

## 2024-12-23 ENCOUNTER — APPOINTMENT (OUTPATIENT)
Dept: PHYSICAL THERAPY | Facility: CLINIC | Age: 79
End: 2024-12-23
Payer: COMMERCIAL

## 2024-12-23 ENCOUNTER — OFFICE VISIT (OUTPATIENT)
Dept: PHYSICAL THERAPY | Facility: CLINIC | Age: 79
End: 2024-12-23
Payer: COMMERCIAL

## 2024-12-23 DIAGNOSIS — Z96.641 S/P TOTAL RIGHT HIP ARTHROPLASTY: Primary | ICD-10-CM

## 2024-12-23 PROCEDURE — 97140 MANUAL THERAPY 1/> REGIONS: CPT

## 2024-12-23 PROCEDURE — 97110 THERAPEUTIC EXERCISES: CPT

## 2024-12-23 PROCEDURE — 97112 NEUROMUSCULAR REEDUCATION: CPT

## 2024-12-23 NOTE — PROGRESS NOTES
"Daily Note     Today's date: 2024  Patient name: Lauri Juárez  : 1945  MRN: [id number]  Referring provider: Roman Michael MD  Dx:   Encounter Diagnosis     ICD-10-CM    1. S/P total right hip arthroplasty  Z96.641                      Subjective: Pt reports most times he has no pain, and feels worst pain is less than a 2/10. Pt denies calf pain.       Objective: See treatment diary below      Assessment: Tolerated treatment well. Patient would benefit from continued PT.  No significant discomfort noted w/ TE performed; min difficulty noted w/  standing hip flexion, extension. No adverse effects noted w/ PROM.       Plan: Progress treatment as tolerated.       Precautions:     Diagnosis: S/P R Posterior ANTHONY    Precautions: Posterior Hip Precautions, Pacemaker, PAF   POC: -   Authorization: [employer]   Access Code:     Visit Count 1 2 3 4 5   Manuals       R Hip/LE PROM & Stretching   LM                      Neuro Re-Ed        Quad Set   30x3\"      Glute Set   20x3\"      Bridge         SLR                         There Ex         Active warm up NuStep L1 10' Nustep 6' lv 1      LAQ  3x10      Heel Raise   3x10      Standing Hip 3-way   2x10 ea BL                             Ther Act             Step Up         Sit to Stand         Squat   2x10                                  Modalities                                   Assessment IE        Education S/S, POC, HEP                  "

## 2024-12-24 ENCOUNTER — APPOINTMENT (OUTPATIENT)
Dept: PHYSICAL THERAPY | Facility: CLINIC | Age: 79
End: 2024-12-24
Payer: COMMERCIAL

## 2024-12-26 ENCOUNTER — OFFICE VISIT (OUTPATIENT)
Dept: PHYSICAL THERAPY | Facility: CLINIC | Age: 79
End: 2024-12-26
Payer: COMMERCIAL

## 2024-12-26 DIAGNOSIS — Z96.641 S/P TOTAL RIGHT HIP ARTHROPLASTY: Primary | ICD-10-CM

## 2024-12-26 PROCEDURE — 97140 MANUAL THERAPY 1/> REGIONS: CPT

## 2024-12-26 PROCEDURE — 97110 THERAPEUTIC EXERCISES: CPT

## 2024-12-26 PROCEDURE — 97530 THERAPEUTIC ACTIVITIES: CPT

## 2024-12-26 NOTE — PROGRESS NOTES
"Daily Note     Today's date: 2024  Patient name: Lauri Juárez  : 1945  MRN: [id number]  Referring provider: Roman Michael MD  Dx:   Encounter Diagnosis     ICD-10-CM    1. S/P total right hip arthroplasty  Z96.641           Start Time: 1630  Stop Time: 1710  Total time in clinic (min): 40 minutes    Subjective: Patient offers no major complaints other then fatigue following activity. He does find some challenge going up and down steps.       Objective: See treatment diary below      Assessment: Tolerated treatment well. Progressed functional exercise tolerance this date. Performed step ups using RLE to lead activity which proved somewhat challenging and overall fatiguing for the LE. Challenged with resisted walking with therapist cueing for equal step length. Progress as able. Patient would benefit from continued PT      Plan: Continue per plan of care.      Precautions:     Diagnosis: S/P R Posterior ANTHONY    Precautions: Posterior Hip Precautions, Pacemaker, PAF   POC: -   Authorization: [facility]   Access Code:     Visit Count 1 2 3 4 5   Manuals      R Hip/LE PROM & Stretching   LM EB                      Neuro Re-Ed        Quad Set   30x3\"      Glute Set   20x3\"              SLR                         There Ex         Active warm up NuStep L1 10' Nustep 6' lv 1      LAQ  3x10      Heel Raise   3x10      Standing Hip 3-way   2x10 ea BL 2x10 ea BL     Bridge    2x10 with iso                     Ther Act             Step Up    2x10 fwd/lat 6\"     Sit to Stand    2x10 10# kb     Squat   2x10       Resisted fwd walking       12x 15#         Resisted bwd walking       12x 15#        Modalities                                   Assessment IE        Education S/S, POC, HEP                    "

## 2024-12-27 ENCOUNTER — APPOINTMENT (OUTPATIENT)
Dept: PHYSICAL THERAPY | Facility: CLINIC | Age: 79
End: 2024-12-27
Payer: COMMERCIAL

## 2025-01-03 ENCOUNTER — OFFICE VISIT (OUTPATIENT)
Dept: PHYSICAL THERAPY | Facility: CLINIC | Age: 80
End: 2025-01-03
Payer: COMMERCIAL

## 2025-01-03 DIAGNOSIS — Z96.641 S/P TOTAL RIGHT HIP ARTHROPLASTY: Primary | ICD-10-CM

## 2025-01-03 PROCEDURE — 97110 THERAPEUTIC EXERCISES: CPT

## 2025-01-03 PROCEDURE — 97530 THERAPEUTIC ACTIVITIES: CPT

## 2025-01-03 PROCEDURE — 97140 MANUAL THERAPY 1/> REGIONS: CPT

## 2025-01-03 NOTE — PROGRESS NOTES
"Daily Note     Today's date: 1/3/2025  Patient name: Lauri Juárez  : 1945  MRN: [id number]  Referring provider: Roman Michael MD  Dx:   Encounter Diagnosis     ICD-10-CM    1. S/P total right hip arthroplasty  Z96.641                      Subjective: Patient reports overall he is doing really well without much discomfort. Does note difficulty walking where he notices his foot \"slaps\" down.       Objective: See treatment diary below      Assessment: Tolerated treatment well. Progressed functional exercise this date. Assessed lower leg strength with myotomes WNL and with minimal compensation. Patient was however challenged with sit to stands and lunging with increased fatigue noted following. Some compensations required to perform activity. Progress as able. Patient would benefit from continued PT      Plan: Continue per plan of care.      Precautions:     Diagnosis: S/P R Posterior ANTHONY    Precautions: Posterior Hip Precautions, Pacemaker, PAF   POC: -   Authorization: [facility]OMN   Access Code:     Visit Count 1 2 3 4 5   Manuals 12/19 12/23 12/26 1/3    R Hip/LE PROM & Stretching   LM EB  EB                     Neuro Re-Ed        Quad Set   30x3\"      Glute Set   20x3\"              SLR                         There Ex         Active warm up NuStep L1 10' Nustep 6' lv 1  Nustep 6' L3     LAQ  3x10      Heel Raise   3x10      Standing Hip 3-way   2x10 ea BL 2x10 ea BL 2x10 GTB ea     Bridge    2x10 with iso  2x10              sidesteps         2x1' GTB       Ther Act             Step Up    2x10 fwd/lat 6\"     Sit to Stand    2x10 10# kb 2x10 10# kb with foam     Lunges     To bolster 10x R     Squat   2x10  2x10     Resisted fwd walking       12x 15#         Resisted bwd walking       12x 15#        Modalities                                   Assessment IE        Education S/S, POC, HEP         1:1 from [id number]             "

## 2025-01-06 ENCOUNTER — OFFICE VISIT (OUTPATIENT)
Dept: PHYSICAL THERAPY | Facility: CLINIC | Age: 80
End: 2025-01-06
Payer: COMMERCIAL

## 2025-01-06 DIAGNOSIS — Z96.641 S/P TOTAL RIGHT HIP ARTHROPLASTY: Primary | ICD-10-CM

## 2025-01-06 PROCEDURE — 97530 THERAPEUTIC ACTIVITIES: CPT

## 2025-01-06 PROCEDURE — 97140 MANUAL THERAPY 1/> REGIONS: CPT

## 2025-01-06 PROCEDURE — 97110 THERAPEUTIC EXERCISES: CPT

## 2025-01-06 NOTE — PROGRESS NOTES
"Daily Note     Today's date: 2025  Patient name: Lauri Juárez  : 1945  MRN: [id number]  Referring provider: Roman Michael MD  Dx:   Encounter Diagnosis     ICD-10-CM    1. S/P total right hip arthroplasty  Z96.641                      Subjective: Patient reported some anterior thigh soreness which felt like \"bone\" soreness following  visit. This continued into the weekend and has improved by todays visit.       Objective: See treatment diary below      Assessment: Tolerated treatment well. Continued to progress patient LE ROM and strength during this dates visit. Discomfort patient feeling most likely secondary to decreased quad strength. Some discomfort noted during repeated sit to stands. Will continue to build quad/hamstring strength. Progress as able. Patient would benefit from continued PT      Plan: Continue per plan of care.      Precautions:     Diagnosis: S/P R Posterior ANTHONY    Precautions: Posterior Hip Precautions, Pacemaker, PAF   POC: -   Authorization: [facility]   Access Code:     Visit Count 1 2 3 4 5   Manuals 12/19 12/23 12/26 1/3 1   R Hip/LE PROM & Stretching   LM EB  EB  EB                   Neuro Re-Ed        Quad Set   30x3\"      Glute Set   20x3\"              SLR                         There Ex         Active warm up NuStep L1 10' Nustep 6' lv 1  Nustep 6' L3  Nustep 6' L4    LAQ  3x10      Heel Raise   3x10      Standing Hip 3-way   2x10 ea BL 2x10 ea BL 2x10 GTB ea  2x10 GTB ea    Bridge    2x10 with iso  2x10  2x10 with pball    DKTC      20x 3\"    sidesteps         2x1' GTB   2x 1' GTB     Ther Act             Step Up    2x10 fwd/lat 6\"     Sit to Stand    2x10 10# kb 2x10 10# kb with foam  3x5 10# kb with foam   Lunges     To bolster 10x R     Squat   2x10  2x10 2x10    Knee extension/hamstring curl      NV   Leg press      NV     Resisted fwd walking       12x 15#         Resisted bwd walking       12x 15#        Modalities                                 "   Assessment IE        Education S/S, POC, HEP         1:1 from [id number]

## 2025-01-08 ENCOUNTER — OFFICE VISIT (OUTPATIENT)
Dept: PHYSICAL THERAPY | Facility: CLINIC | Age: 80
End: 2025-01-08
Payer: COMMERCIAL

## 2025-01-08 DIAGNOSIS — Z96.641 S/P TOTAL RIGHT HIP ARTHROPLASTY: Primary | ICD-10-CM

## 2025-01-08 PROCEDURE — 97140 MANUAL THERAPY 1/> REGIONS: CPT

## 2025-01-08 PROCEDURE — 97110 THERAPEUTIC EXERCISES: CPT

## 2025-01-08 NOTE — PROGRESS NOTES
"Daily Note     Today's date: 2025  Patient name: Lauri Juárez  : 1945  MRN: [id number]  Referring provider: Roman Michael MD  Dx:   Encounter Diagnosis     ICD-10-CM    1. S/P total right hip arthroplasty  Z96.641                      Subjective: Patient reports improved aching following the visit on Monday. Is concerned about lying on the R hip and getting up from the ground. No other complaints noted.      Objective: See treatment diary below      Assessment: Tolerated treatment well. Good tolerance to manuals with some discomfort in abduction end range. Continued to progress loading tolerance of the RLE. Added leg press and knee extension/hamstring curl machine this date to good tolerance. Fatigue in quad/hamstring complex present at end of visit. Progress as able. Patient would benefit from continued PT      Plan: Continue per plan of care.      Precautions:     Diagnosis: S/P R Posterior ANTHONY    Precautions: Posterior Hip Precautions, Pacemaker, PAF   POC: -   Authorization: [facility]   Access Code:     Visit Count 1 2 3 4 5   Manuals 1/8 12/23 12/26 1/3 1/6   R Hip/LE PROM & Stretching  EB LM EB  EB  EB                   Neuro Re-Ed        Quad Set   30x3\"      Glute Set   20x3\"              SLR                         There Ex         Active warm up NuStep L6 7' Nustep 6' lv 1  Nustep 6' L3  Nustep 6' L4    LAQ  3x10      Leg press  3x10 120# DL; SL; SL 2x10 70# ea        RDL  2x15 10# kb        Knee extension/hamstring curl  2x10 33# ea DL       Heel Raise   3x10      Standing Hip 3-way   2x10 ea BL 2x10 ea BL 2x10 GTB ea  2x10 GTB ea    Bridge  2x10 with pball   2x10 with iso  2x10  2x10 with pball    Clamshells  2x15 R only RTB        DKTC  20x3\"     20x 3\"    sidesteps         2x1' GTB   2x 1' GTB     Ther Act             Step Up    2x10 fwd/lat 6\"     Sit to Stand    2x10 10# kb 2x10 10# kb with foam  3x5 10# kb with foam   Lunges     To bolster 10x R     Squat   2x10  2x10 2x10    Knee " extension/hamstring curl      NV   Leg press      NV     Resisted fwd walking       12x 15#         Resisted bwd walking       12x 15#        Modalities                                   Assessment        Education          1:1 from [id number]

## 2025-01-13 ENCOUNTER — OFFICE VISIT (OUTPATIENT)
Dept: PHYSICAL THERAPY | Facility: CLINIC | Age: 80
End: 2025-01-13
Payer: COMMERCIAL

## 2025-01-13 DIAGNOSIS — Z96.641 S/P TOTAL RIGHT HIP ARTHROPLASTY: Primary | ICD-10-CM

## 2025-01-13 PROCEDURE — 97110 THERAPEUTIC EXERCISES: CPT

## 2025-01-13 PROCEDURE — 97140 MANUAL THERAPY 1/> REGIONS: CPT

## 2025-01-13 NOTE — PROGRESS NOTES
"Daily Note     Today's date: 2025  Patient name: Lauri Juárez  : 1945  MRN: [id number]  Referring provider: Roman Michael MD  Dx:   Encounter Diagnosis     ICD-10-CM    1. S/P total right hip arthroplasty  Z96.641                      Subjective: Patient reports a positive visit with referring provider. Continues to note fatigue but finds that there is overall improvements in his strength.       Objective: See treatment diary below      Assessment: Tolerated treatment well. Continued to progress patient LE loading tolerance and functional exercise. Challenged with higher step heights and increased loading through the LE with patient exhibiting weak hip musculature, most notably with hip extension which makes higher surfaces more challenging to reach without compensation of the quad musculature. Fatigue at end of visit. Progress as able.   Patient would benefit from continued PT      Plan: Continue per plan of care.      Precautions:     Diagnosis: S/P R Posterior ANTHONY    Precautions: Posterior Hip Precautions, Pacemaker, PAF   POC: -   Authorization: [facility]   Access Code:     Visit Count 1 2 3 4 5   Manuals 1/8 1/13 12/26 1/3 1/6   R Hip/LE PROM & Stretching  EB EB  EB  EB  EB                   Neuro Re-Ed        Quad Set         Glute Set                 SLR                         There Ex         Active warm up NuStep L6 7'   Nustep 6' L3  Nustep 6' L4    LAQ        Leg press  3x10 120# DL; SL; SL 2x10 70# ea  3x10 120# DL; SL; SL 2x10 70# ea       RDL  2x15 10# kb  2x15 10# kb       Knee extension/hamstring curl  2x10 33# ea DL 2x10 44# ea DL      Heel Raise         Standing Hip 3-way    2x10 ea BL 2x10 GTB ea  2x10 GTB ea    Bridge  2x10 with pball  2x10 with pball  2x10 with iso  2x10  2x10 with pball    Clamshells  2x15 R only RTB  2x15 R only RTB       DKTC  20x3\"  30x3\"   20x 3\"    sidesteps         2x1' GTB   2x 1' GTB     Ther Act             Step Up   8\" 2x10  2x10 fwd/lat 6\"     Sit to " "Stand   From 12\" box + 2 foams 15x  2x10 10# kb 2x10 10# kb with foam  3x5 10# kb with foam   Lunges     To bolster 10x R     Squat     2x10 2x10    Knee extension/hamstring curl      NV   Leg press      NV     Resisted fwd walking      12x 15#         Resisted bwd walking      12x 15#        Modalities                                 Assessment        Education          1:1 from [id number]                   "

## 2025-01-16 ENCOUNTER — OFFICE VISIT (OUTPATIENT)
Dept: PHYSICAL THERAPY | Facility: CLINIC | Age: 80
End: 2025-01-16
Payer: COMMERCIAL

## 2025-01-16 DIAGNOSIS — Z96.641 S/P TOTAL RIGHT HIP ARTHROPLASTY: Primary | ICD-10-CM

## 2025-01-16 DIAGNOSIS — E03.9 ACQUIRED HYPOTHYROIDISM: ICD-10-CM

## 2025-01-16 DIAGNOSIS — E78.2 MIXED HYPERLIPIDEMIA: ICD-10-CM

## 2025-01-16 PROCEDURE — 97140 MANUAL THERAPY 1/> REGIONS: CPT

## 2025-01-16 PROCEDURE — 97110 THERAPEUTIC EXERCISES: CPT

## 2025-01-16 RX ORDER — LEVOTHYROXINE SODIUM 125 UG/1
125 TABLET ORAL
Qty: 90 TABLET | Refills: 1 | Status: SHIPPED | OUTPATIENT
Start: 2025-01-16

## 2025-01-16 RX ORDER — ATORVASTATIN CALCIUM 20 MG/1
20 TABLET, FILM COATED ORAL DAILY
Qty: 30 TABLET | Refills: 0 | Status: SHIPPED | OUTPATIENT
Start: 2025-01-16 | End: 2026-05-22

## 2025-01-16 NOTE — PROGRESS NOTES
PT Discharge     Today's date: 2025  Patient name: Lauri Juárez  : 1945  MRN: [id number]  Referring provider: Roman Michael MD  Dx:   Encounter Diagnosis     ICD-10-CM    1. S/P total right hip arthroplasty  Z96.641                        Assessment    Assessment details: From D/c: Lauri Juárez is a 80 y.o. male who presented with signs and symptoms consistent of referring diagnosis. Patient has made excellent progress during his time at  [facility]. Patient has made large functional gains in ROM, strength, motor control, and mobility which has led to increased activity tolerance. Patient has met all of his functional and rehabilitation goals with FOTO surpassing expected score. Patient was provided with a comprehensive HEP and educated on importance of performing HEP to minimize future complaints. Patient was receptive to information provided by therapist. At this time, patient is agreeable to and appropriate for d/c from therapy services with progression to gym program. Thank you for this referral. D/c POC.       From IE: Lauri Juárez is a pleasant 79 y.o. male who presents s/p right ANTHONY performed 24.  The patient's greatest concerns are the pain he is experiencing and fear of not being able to keep active.      No further referral appears necessary at this time based upon examination results.    Primary movement impairment diagnosis of expected limitations in ROM and mm strength given the above surgical procedure resulting in pathoanatomical symptoms of decreased functional mobility and limiting his ability to drive, exercise or recreation, get out of a chair, perform household chores, perform yard work, stand, and walk.    Primary Impairments:  1) Decreased active and passive ROM of the R hip and LE   2) Decreased mm strength of the R hip and LE   3) Impaired gait mechanics and stair mobility     Etiologic factors include none recalled by the patient.    Prognosis details: Positive prognostic  indicators include positive attitude toward recovery and good understanding of diagnosis and treatment plan options.  Negative prognostic indicators include none.      Goals  Short Term Goals: to be achieved by 4 weeks  1) Patient to be independent with basic HEP.- met   2) Decrease pain to 3/10 at its worst.- met   3) Increase pain free R hip ROM by 5-10 degrees- met   4) Increase LE strength by 1/2 MMT grade in all deficient planes.- met   5) TUG and 5x STS scores will improve by >MDIC- met      Long Term Goals: to be achieved by discharge  1) FOTO equal to or greater than expected outcome.- met   2) Ambulation to improve to maximal level of function- met   3) Stair negotiation will improve to reciprocal.- met   4) Sit to stand transfers will improve to maximal level of function- met   5) Patient will be independent with comprehensive HEP.- met   6) Patient will be able to return to full prior activity without pain.- met        Plan  Planned modality interventions: Modalities PRN.    Planned therapy interventions: home exercise program      Lauri reports a perceived improvement of 85%. Patient notes pain has reduced to 0. Patient reports overall improvements in strength and activity tolerance. He feels as though to reach 100% he will need to continue to build strength and overall exercise tolerance. Overall, patient has made steady progress toward goals and would benefit from d/c at this time.       Subjective Evaluation    History of Present Illness  Date of surgery: 12/2/2024  Mechanism of injury: surgery  Mechanism of injury: Lauri Juárez presents s/p right posterior ANTHONY performed 12/2/24. Pt had approx 2 wks of home care and will now transition to [facility]. Denies N/T in the RLE. Aware of posterior hip precautions. First f/u with Dr. Michael scheduled for 1/9/25.   Pain location: right hip, descriptors: dull, ache, and tight  Aggravating factors: no specific activity   Relieving factors: no specific activity    24hr pain pattern: 0/10 (current), 0/10 (best), 2/10 (worst)   Imaging: X-ray  Previous treatments: previous physical therapy (s/p R TKA in )   Occupation/recreation: retired; enjoys travelling in his [place], general house and yard work, playing golf, wants to return to walking more frequently   Sleeping: no disturbed sleep reported (wakes up every 1.5-2 hrs due to taking Flomax)   Patient concerns: decreasing pain, improving mobility, improving function, and improving strength  Special Questions: (-) Red flag screening          Objective     Active Range of Motion     Right Hip   Flexion: 110 degrees   Extension: 20 degrees   Abduction: 35 degrees     Strength/Myotome Testing     Left Hip   Normal muscle strength    Right Hip   Planes of Motion   Flexion: 5  Extension: 4+  Abduction: 5  Adduction: 5    Left Knee   Normal strength    Right Knee   Flexion: 4+  Extension: 4+  Quadriceps contraction: good    Left Ankle/Foot   Normal strength    Right Ankle/Foot   Normal strength    Ambulation     Ambulation: Level Surfaces   Ambulation without assistive device: independent    Observational Gait   Gait: antalgic     Additional Observational Gait Details  Slightly antalgic gait on the R, no use of AD for ambulation, will use SPC for community distances or balance     General Comments:      Hip Comments   TU.03 seconds, no AD   5xSTS: 11.7 no arms              Diagnosis: S/P R Posterior ANTHONY    Precautions: Posterior Hip Precautions, Pacemaker, PAF   POC: -   Authorization: [employer]   Access Code:     Visit Count 1 2 3 4 5   Manuals 1/8 1/13 1/16 1/3 1/6   R Hip/LE PROM & Stretching  EB EB   EB  EB   Re-eval    EB              Neuro Re-Ed        Quad Set         Glute Set                 SLR                         There Ex         Active warm up NuStep L6 7'  Nustep 7' L4 Nustep 6' L3  Nustep 6' L4    LAQ        Leg press  3x10 120# DL; SL; SL 2x10 70# ea  3x10 120# DL; SL; SL 2x10 70# ea  3x10 120# DL; SL; SL  "2x10 70# ea     RDL  2x15 10# kb  2x15 10# kb       Knee extension/hamstring curl  2x10 33# ea DL 2x10 44# ea DL 2x10 44# ea DL      Heel Raise         Standing Hip 3-way     2x10 GTB ea  2x10 GTB ea    Bridge  2x10 with pball  2x10 with pball   2x10  2x10 with pball    Clamshells  2x15 R only RTB  2x15 R only RTB       DKTC  20x3\"  30x3\"   20x 3\"   Patient education   EB prognosis, transition to fitness program      sidesteps         2x1' GTB   2x 1' GTB     Ther Act             Step Up   8\" 2x10       Sit to Stand   From 12\" box + 2 foams 15x   2x10 10# kb with foam  3x5 10# kb with foam   Lunges     To bolster 10x R     Squat     2x10 2x10    Knee extension/hamstring curl      NV   Leg press      NV     Resisted fwd walking             Resisted bwd walking            Modalities                                Assessment        Education                 "

## 2025-01-18 ENCOUNTER — RA CDI HCC (OUTPATIENT)
Dept: OTHER | Facility: HOSPITAL | Age: 80
End: 2025-01-18

## 2025-01-18 NOTE — PROGRESS NOTES
HCC coding opportunities          Chart Reviewed number of suggestions sent to Provider: 1   I49.5    Patients Insurance     Medicare Insurance: [facility] [employer] Ad[employer]age

## 2025-01-31 ENCOUNTER — OFFICE VISIT (OUTPATIENT)
Dept: INTERNAL MEDICINE CLINIC | Facility: CLINIC | Age: 80
End: 2025-01-31
Payer: COMMERCIAL

## 2025-01-31 VITALS
WEIGHT: 241 LBS | HEART RATE: 61 BPM | TEMPERATURE: 98 F | SYSTOLIC BLOOD PRESSURE: 134 MMHG | HEIGHT: 69 IN | DIASTOLIC BLOOD PRESSURE: 78 MMHG | OXYGEN SATURATION: 99 % | BODY MASS INDEX: 35.7 KG/M2

## 2025-01-31 DIAGNOSIS — Z00.00 MEDICARE ANNUAL WELLNESS VISIT, SUBSEQUENT: ICD-10-CM

## 2025-01-31 DIAGNOSIS — E66.01 OBESITY, MORBID (HCC): ICD-10-CM

## 2025-01-31 DIAGNOSIS — M16.11 PRIMARY OSTEOARTHRITIS OF RIGHT HIP: ICD-10-CM

## 2025-01-31 DIAGNOSIS — E03.9 ACQUIRED HYPOTHYROIDISM: ICD-10-CM

## 2025-01-31 DIAGNOSIS — E78.2 MIXED HYPERLIPIDEMIA: Primary | ICD-10-CM

## 2025-01-31 DIAGNOSIS — I48.0 PAF (PAROXYSMAL ATRIAL FIBRILLATION) (HCC): ICD-10-CM

## 2025-01-31 DIAGNOSIS — I49.5 SICK SINUS SYNDROME (HCC): ICD-10-CM
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
## 2025-01-31 DIAGNOSIS — E66.9 OBESITY (BMI 30-39.9): ICD-10-CM

## 2025-01-31 DIAGNOSIS — G47.30 SLEEP APNEA, UNSPECIFIED TYPE: ICD-10-CM

## 2025-01-31 PROCEDURE — G0439 PPPS, SUBSEQ VISIT: HCPCS | Performed by: INTERNAL MEDICINE

## 2025-01-31 PROCEDURE — 99214 OFFICE O/P EST MOD 30 MIN: CPT | Performed by: INTERNAL MEDICINE

## 2025-01-31 PROCEDURE — G2211 COMPLEX E/M VISIT ADD ON: HCPCS | Performed by: INTERNAL MEDICINE

## 2025-01-31 NOTE — ASSESSMENT & PLAN NOTE
Continue levothyroxine 125 mcg daily    Orders:    TSH, 3rd generation; Future    T4, free; Future

## 2025-01-31 NOTE — PROGRESS NOTES
Name: Lauri Juárez      : 1945      MRN: [id number]  Encounter Provider: Austin Orona MD  Encounter Date: 2025   Encounter department: [facility]    Assessment & Plan  Medicare annual wellness visit, subsequent    Mixed hyperlipidemia  Continue Lipitor 20 mg daily    Orders:    CBC and differential; Future    Comprehensive metabolic panel; Future    Lipid panel; Future    Urinalysis with microscopic; Future    Sick sinus syndrome (HCC)  Status post pacemaker   PAF (paroxysmal atrial fibrillation) (HCC)  Diagnosed through remote device interrogations.  Stable.  Follows with cardiology.    Currently on aspirin.  Declined DOAC  Acquired hypothyroidism  Continue levothyroxine 125 mcg daily    Orders:    TSH, 3rd generation; Future    T4, free; Future    Sleep apnea, unspecified type  Follows with sleep medicine.  Continue CPAP nightly  Obesity (BMI 30-39.9)    Primary osteoarthritis of right hip  Status post right hip replacement 2024  Mild sun exposure, initial encounter    Orders:    Ambulatory Referral to Dermatology; Future         History of Present Illness     José Luis Juárez is a 80-year-old male presenting for AWV      Review of Systems   Constitutional:  Negative for appetite change, chills, fatigue and fever.   HENT:  Negative for sore throat and trouble swallowing.    Eyes:  Negative for redness.   Respiratory:  Negative for shortness of breath.    Cardiovascular:  Negative for chest pain and palpitations.   Gastrointestinal:  Negative for abdominal pain, constipation and diarrhea.   Genitourinary:  Negative for dysuria and hematuria.   Musculoskeletal:  Positive for arthralgias. Negative for back pain and neck pain.   Skin:  Negative for rash.   Neurological:  Negative for seizures, weakness and headaches.   Hematological:  Negative for adenopathy.   Psychiatric/Behavioral:  Negative for confusion. The patient is not nervous/anxious.      Past Medical History:    Diagnosis Date    Acute combined systolic and diastolic congestive heart failure (HCC) 1/24/2024    Allergic     Disease of thyroid gland     Obesity     Obesity, morbid (HCC) 12/8/2021    Visual impairment 2019     Past Surgical History:   Procedure Laterality Date    CARDIAC PACEMAKER PLACEMENT      EYE SURGERY  02/2022    Cataracts    HIP SURGERY      TONSILLECTOMY       Family History   Problem Relation Age of Onset    Myasthenia gravis Mother     COPD Father     Heart disease Father      Social History     Tobacco Use    Smoking status: Never    Smokeless tobacco: Never    Tobacco comments:     Nonsmoker - As per [employer]    Vaping Use    Vaping status: Never Used   Substance and Sexual Activity    Alcohol use: Yes     Alcohol/week: 7.0 standard drinks of alcohol     Types: 7 Standard drinks or equivalent per week     Comment: socially     Drug use: Never    Sexual activity: Yes     Partners: Female     Birth control/protection: Surgical, Male Sterilization     Current Outpatient Medications on File Prior to Visit   Medication Sig    atorvastatin (LIPITOR) 20 mg tablet Take 1 tablet (20 mg total) by mouth daily    levothyroxine (Levoxyl) 125 mcg tablet Take 1 tablet (125 mcg total) by mouth daily in the early morning    Red Yeast Rice 600 MG CAPS Take 600 mg by mouth 2 (two) times a day    aspirin (ECOTRIN LOW STRENGTH) 81 mg EC tablet Take 81 mg by mouth 2 (two) times a day    mupirocin (BACTROBAN) 2 % ointment     Saw Palmetto, Serenoa repens, (SAW PALMETTO PO) Take 1 Dose by mouth    [DISCONTINUED] celecoxib (CeleBREX) 200 mg capsule Take 200 mg by mouth 1 (one) time (Patient not taking: Reported on 12/19/2024)    [DISCONTINUED] meloxicam (MOBIC) 15 mg tablet Take 0.5 tablets (7.5 mg total) by mouth daily as needed for moderate pain     No Known Allergies  Immunization History   Administered Date(s) Administered    COVID-19 MODERNA VACC 0.25 ML IM BOOSTER 12/08/2021    COVID-19 MODERNA VACC 0.5 ML IM  "01/22/2021, 02/17/2021    Td (adult), Unspecified 08/06/2021    Tdap 02/25/2014     Objective   /78 (BP Location: Left arm, Patient Position: Sitting, Cuff Size: Standard)   Pulse 61   Temp 98 °F (36.7 °C) (Temporal)   Ht 5' 9\" (1.753 m)   Wt 109 kg (241 lb)   SpO2 99%   BMI 35.59 kg/m²     Physical Exam  Vitals and nursing note reviewed.   Constitutional:       General: He is not in acute distress.     Appearance: He is well-developed.   HENT:      Head: Normocephalic and atraumatic.   Eyes:      Conjunctiva/sclera: Conjunctivae normal.   Cardiovascular:      Rate and Rhythm: Normal rate and regular rhythm.      Heart sounds: No murmur heard.  Pulmonary:      Effort: Pulmonary effort is normal. No respiratory distress.      Breath sounds: Normal breath sounds.   Abdominal:      Palpations: Abdomen is soft.      Tenderness: There is no abdominal tenderness.   Musculoskeletal:         General: No swelling.      Cervical back: Normal range of motion and neck supple.   Skin:     General: Skin is warm and dry.      Capillary Refill: Capillary refill takes less than 2 seconds.   Neurological:      Mental Status: He is alert.   Psychiatric:         Mood and Affect: Mood normal.         Answers submitted by the patient for this visit:  AUDIT (Alcohol Use Disorders Identification Test) Screening (Submitted on 1/26/2025)  How often during the last year have you found that you were not able to stop drinking once you had started?: 0 - never  How often during the last year have you failed to do what was normally expected from you because of drinking?: 0 - never  How often during the last year have you needed a first drink in the morning to get yourself going after a heavy drinking session?: 0 - never  How often during the last year have you had a feeling of guilt or remorse after drinking?: 0 - never  How often during the last year have you been unable to remember what happened the night before because you had been " "drinking?: 0 - never  Have you or someone else been injured as a result of your drinking?: 0 - no  Has a relative or friend or a doctor or another health worker been concerned about your drinking or suggested you cut down?: 0 - no  Medicare Annual Wellness Visit (Submitted on 1/26/2025)  How would you rate your overall health?: very good  Compared to last year, how is your physical health?: same  In general, how satisfied are you with your life?: very satisfied  Compared to last year, how is your eyesight?: slightly worse  Compared to last year, how is your hearing?: slightly worse  Compared to last year, how is your emotional/mental health?: same  How often is anger a problem for you?: never, rarely  How often do you feel unusually tired/fatigued?: never, rarely  In the past 7 days, how much pain have you experienced?: some  If you answered \"some\" or \"a lot\", please rate the severity of your pain on a scale of 1 to 10 (1 being the least severe pain and 10 being the most intense pain).: 1/10  In the past 6 months, have you lost or gained 10 pounds without trying?: No  One or more falls in the last year: No  Do you have trouble with the stairs inside or outside your home?: No  Does your home have working smoke alarms?: Yes  Does your home have a carbon monoxide monitor?: Yes  Which safety hazards (if any) have you experienced in your home? Please select all that apply.: none  How would you describe your current diet? Please select all that apply.: Regular, Limited junk food  In addition to prescription medications, are you taking any over-the-counter supplements?: Yes  If yes, what supplements are you taking?: Red Yeast Rice  Can you manage your medications?: Yes  Are you currently taking any opioid medications?: No  Can you walk and transfer into and out of your bed and chair?: Yes  Can you dress and groom yourself?: Yes  Can you bathe or shower yourself?: Yes  Can you feed yourself?: Yes  Can you do your laundry/ " housekeeping?: Yes  Can you manage your money, pay your bills, and track your expenses?: Yes  Can you make your own meals?: Yes  Can you do your own shopping?: Yes  Please list your DME (Durable Medical Equipment) supplier, if you use one.: [employer]  Within the last 12 months, have you had any hospitalizations or Emergency Department visits?: Yes  If yes, how many times have you been hospitalized within the past year?: 1-2  Do you have a living will?: Yes  Do you have a Durable POA (Power of ) for healthcare decisions?: Yes  Do you have an Advanced Directive for end of life decisions?: Yes  How often have you used an illegal drug (including marijuana) or a prescription medication for non-medical reasons in the past year?: never  What is the typical number of drinks you consume in a day?: 4  What is the typical number of drinks you consume in a week?: 7  How often did you have a drink containing alcohol in the past year?: 4 or more times a week  How many drinks did you have on a typical day  when you were drinking in the past year?: 1 to 2  How often did you have 6 or more drinks on one occasion in the past year?: never

## 2025-01-31 NOTE — ASSESSMENT & PLAN NOTE
Continue Lipitor 20 mg daily    Orders:    CBC and differential; Future    Comprehensive metabolic panel; Future    Lipid panel; Future    Urinalysis with microscopic; Future

## 2025-01-31 NOTE — ASSESSMENT & PLAN NOTE
Diagnosed through remote device interrogations.  Stable.  Follows with cardiology.    Currently on aspirin.  Declined DOAC

## 2025-02-04 ENCOUNTER — APPOINTMENT (OUTPATIENT)
Dept: LAB | Facility: CLINIC | Age: 80
End: 2025-02-04
Payer: COMMERCIAL

## 2025-02-04 DIAGNOSIS — E03.9 ACQUIRED HYPOTHYROIDISM: ICD-10-CM

## 2025-02-04 DIAGNOSIS — E78.2 MIXED HYPERLIPIDEMIA: ICD-10-CM

## 2025-02-04 LAB
ALBUMIN SERPL BCG-MCNC: 4.2 G/DL (ref 3.5–5)
ALP SERPL-CCNC: 70 U/L (ref 34–104)
ALT SERPL W P-5'-P-CCNC: 14 U/L (ref 7–52)
ANION GAP SERPL CALCULATED.3IONS-SCNC: 5 MMOL/L (ref 4–13)
AST SERPL W P-5'-P-CCNC: 13 U/L (ref 13–39)
BACTERIA UR QL AUTO: ABNORMAL /HPF
BASOPHILS # BLD AUTO: 0.05 THOUSANDS/ΜL (ref 0–0.1)
BASOPHILS NFR BLD AUTO: 1 % (ref 0–1)
BILIRUB SERPL-MCNC: 0.62 MG/DL (ref 0.2–1)
BILIRUB UR QL STRIP: NEGATIVE
BUN SERPL-MCNC: 20 MG/DL (ref 5–25)
CALCIUM SERPL-MCNC: 9.2 MG/DL (ref 8.4–10.2)
CAOX CRY URNS QL MICRO: ABNORMAL /HPF
CHLORIDE SERPL-SCNC: 104 MMOL/L (ref 96–108)
CHOLEST SERPL-MCNC: 149 MG/DL (ref ?–200)
CLARITY UR: CLEAR
CO2 SERPL-SCNC: 29 MMOL/L (ref 21–32)
COLOR UR: YELLOW
CREAT SERPL-MCNC: 0.81 MG/DL (ref 0.6–1.3)
EOSINOPHIL # BLD AUTO: 0.25 THOUSAND/ΜL (ref 0–0.61)
EOSINOPHIL NFR BLD AUTO: 4 % (ref 0–6)
ERYTHROCYTE [DISTWIDTH] IN BLOOD BY AUTOMATED COUNT: 13.3 % (ref 11.6–15.1)
GFR SERPL CREATININE-BSD FRML MDRD: 84 ML/MIN/1.73SQ M
GLUCOSE P FAST SERPL-MCNC: 97 MG/DL (ref 65–99)
GLUCOSE UR STRIP-MCNC: NEGATIVE MG/DL
HCT VFR BLD AUTO: 45.3 % (ref 36.5–49.3)
HDLC SERPL-MCNC: 50 MG/DL
HGB BLD-MCNC: 14.7 G/DL (ref 12–17)
HGB UR QL STRIP.AUTO: NEGATIVE
IMM GRANULOCYTES # BLD AUTO: 0.02 THOUSAND/UL (ref 0–0.2)
IMM GRANULOCYTES NFR BLD AUTO: 0 % (ref 0–2)
KETONES UR STRIP-MCNC: NEGATIVE MG/DL
LDLC SERPL CALC-MCNC: 82 MG/DL (ref 0–100)
LEUKOCYTE ESTERASE UR QL STRIP: NEGATIVE
LYMPHOCYTES # BLD AUTO: 1.61 THOUSANDS/ΜL (ref 0.6–4.47)
LYMPHOCYTES NFR BLD AUTO: 27 % (ref 14–44)
MCH RBC QN AUTO: 30.2 PG (ref 26.8–34.3)
MCHC RBC AUTO-ENTMCNC: 32.5 G/DL (ref 31.4–37.4)
MCV RBC AUTO: 93 FL (ref 82–98)
MONOCYTES # BLD AUTO: 0.49 THOUSAND/ΜL (ref 0.17–1.22)
MONOCYTES NFR BLD AUTO: 8 % (ref 4–12)
NEUTROPHILS # BLD AUTO: 3.46 THOUSANDS/ΜL (ref 1.85–7.62)
NEUTS SEG NFR BLD AUTO: 60 % (ref 43–75)
NITRITE UR QL STRIP: NEGATIVE
NON-SQ EPI CELLS URNS QL MICRO: ABNORMAL /HPF
NONHDLC SERPL-MCNC: 99 MG/DL
NRBC BLD AUTO-RTO: 0 /100 WBCS
PH UR STRIP.AUTO: 6 [PH]
PLATELET # BLD AUTO: 275 THOUSANDS/UL (ref 149–390)
PMV BLD AUTO: 10.5 FL (ref 8.9–12.7)
POTASSIUM SERPL-SCNC: 4.5 MMOL/L (ref 3.5–5.3)
PROT SERPL-MCNC: 6.6 G/DL (ref 6.4–8.4)
PROT UR STRIP-MCNC: ABNORMAL MG/DL
RBC # BLD AUTO: 4.86 MILLION/UL (ref 3.88–5.62)
RBC #/AREA URNS AUTO: ABNORMAL /HPF
SODIUM SERPL-SCNC: 138 MMOL/L (ref 135–147)
SP GR UR STRIP.AUTO: 1.02 (ref 1–1.03)
T4 FREE SERPL-MCNC: 0.89 NG/DL (ref 0.61–1.12)
TRIGL SERPL-MCNC: 87 MG/DL (ref ?–150)
TSH SERPL DL<=0.05 MIU/L-ACNC: 1.09 UIU/ML (ref 0.45–4.5)
UROBILINOGEN UR STRIP-ACNC: <2 MG/DL
WBC # BLD AUTO: 5.88 THOUSAND/UL (ref 4.31–10.16)
WBC #/AREA URNS AUTO: ABNORMAL /HPF

## 2025-02-04 PROCEDURE — 84439 ASSAY OF FREE THYROXINE: CPT

## 2025-02-04 PROCEDURE — 84443 ASSAY THYROID STIM HORMONE: CPT

## 2025-02-04 PROCEDURE — 80061 LIPID PANEL: CPT

## 2025-02-04 PROCEDURE — 36415 COLL VENOUS BLD VENIPUNCTURE: CPT

## 2025-02-04 PROCEDURE — 85025 COMPLETE CBC W/AUTO DIFF WBC: CPT

## 2025-02-04 PROCEDURE — 80053 COMPREHEN METABOLIC PANEL: CPT

## 2025-02-04 PROCEDURE — 81001 URINALYSIS AUTO W/SCOPE: CPT

## 2025-02-05 ENCOUNTER — RESULTS FOLLOW-UP (OUTPATIENT)
Dept: INTERNAL MEDICINE CLINIC | Facility: CLINIC | Age: 80
End: 2025-02-05

## 2025-02-24 DIAGNOSIS — E78.2 MIXED HYPERLIPIDEMIA: ICD-10-CM

## 2025-02-24 NOTE — TELEPHONE ENCOUNTER
[employer]         atorvastatin (LIPITOR) 20 mg tablet Take 1 tablet (20 mg total) by mouth daily  90 day supply     [facility]     Ordering and Authorizing Provider:  [facility]          Pt is going out of town and needs HP

## 2025-02-25 RX ORDER — ATORVASTATIN CALCIUM 20 MG/1
20 TABLET, FILM COATED ORAL DAILY
Qty: 90 TABLET | Refills: 1 | Status: SHIPPED | OUTPATIENT
Start: 2025-02-25 | End: 2026-07-01

## 2025-02-25 RX ORDER — ATORVASTATIN CALCIUM 20 MG/1
20 TABLET, FILM COATED ORAL DAILY
Qty: 30 TABLET | Refills: 0 | OUTPATIENT
Start: 2025-02-25

## 2025-08-02 DIAGNOSIS — E03.9 ACQUIRED HYPOTHYROIDISM: ICD-10-CM

## 2025-08-06 RX ORDER — LEVOTHYROXINE SODIUM 125 UG/1
125 TABLET ORAL
Qty: 90 TABLET | Refills: 0 | Status: SHIPPED | OUTPATIENT
Start: 2025-08-06